# Patient Record
Sex: MALE | Race: WHITE | NOT HISPANIC OR LATINO | Employment: OTHER | URBAN - METROPOLITAN AREA
[De-identification: names, ages, dates, MRNs, and addresses within clinical notes are randomized per-mention and may not be internally consistent; named-entity substitution may affect disease eponyms.]

---

## 2018-05-14 DIAGNOSIS — E78.5 HYPERLIPIDEMIA, UNSPECIFIED HYPERLIPIDEMIA TYPE: Primary | ICD-10-CM

## 2018-05-14 RX ORDER — ATORVASTATIN CALCIUM 40 MG/1
40 TABLET, FILM COATED ORAL DAILY
COMMUNITY
End: 2018-05-14 | Stop reason: SDUPTHER

## 2018-05-15 RX ORDER — ATORVASTATIN CALCIUM 40 MG/1
40 TABLET, FILM COATED ORAL DAILY
Qty: 90 TABLET | Refills: 0 | Status: SHIPPED | OUTPATIENT
Start: 2018-05-15 | End: 2018-05-30 | Stop reason: SDUPTHER

## 2018-05-22 DIAGNOSIS — E11.9 TYPE 2 DIABETES MELLITUS WITHOUT COMPLICATION, WITHOUT LONG-TERM CURRENT USE OF INSULIN (HCC): Primary | ICD-10-CM

## 2018-05-22 RX ORDER — METFORMIN HYDROCHLORIDE 500 MG/1
500 TABLET, EXTENDED RELEASE ORAL 2 TIMES DAILY WITH MEALS
Qty: 180 TABLET | Refills: 0 | Status: SHIPPED | OUTPATIENT
Start: 2018-05-22 | End: 2018-05-30 | Stop reason: SDUPTHER

## 2018-05-30 ENCOUNTER — OFFICE VISIT (OUTPATIENT)
Dept: ENDOCRINOLOGY | Facility: HOSPITAL | Age: 73
End: 2018-05-30
Payer: MEDICARE

## 2018-05-30 VITALS
HEART RATE: 48 BPM | HEIGHT: 70 IN | SYSTOLIC BLOOD PRESSURE: 150 MMHG | WEIGHT: 186.6 LBS | BODY MASS INDEX: 26.71 KG/M2 | DIASTOLIC BLOOD PRESSURE: 82 MMHG

## 2018-05-30 DIAGNOSIS — E11.9 TYPE 2 DIABETES MELLITUS WITHOUT COMPLICATION, WITHOUT LONG-TERM CURRENT USE OF INSULIN (HCC): ICD-10-CM

## 2018-05-30 DIAGNOSIS — E78.5 HYPERLIPIDEMIA, UNSPECIFIED HYPERLIPIDEMIA TYPE: ICD-10-CM

## 2018-05-30 DIAGNOSIS — I10 HYPERTENSION, UNSPECIFIED TYPE: ICD-10-CM

## 2018-05-30 DIAGNOSIS — E11.8 TYPE 2 DIABETES MELLITUS WITH COMPLICATION, WITHOUT LONG-TERM CURRENT USE OF INSULIN (HCC): Primary | ICD-10-CM

## 2018-05-30 PROCEDURE — 99204 OFFICE O/P NEW MOD 45 MIN: CPT | Performed by: INTERNAL MEDICINE

## 2018-05-30 RX ORDER — METOPROLOL SUCCINATE 50 MG/1
50 TABLET, EXTENDED RELEASE ORAL DAILY
Refills: 0 | COMMUNITY
Start: 2018-05-12 | End: 2018-05-30 | Stop reason: SDUPTHER

## 2018-05-30 RX ORDER — IBUPROFEN 800 MG
TABLET ORAL
COMMUNITY

## 2018-05-30 RX ORDER — METOPROLOL SUCCINATE 50 MG/1
50 TABLET, EXTENDED RELEASE ORAL DAILY
Qty: 90 TABLET | Refills: 3 | Status: SHIPPED | OUTPATIENT
Start: 2018-05-30 | End: 2019-05-21 | Stop reason: SDUPTHER

## 2018-05-30 RX ORDER — RAMIPRIL 10 MG/1
10 CAPSULE ORAL DAILY
Refills: 1 | COMMUNITY
Start: 2018-04-05 | End: 2018-10-08 | Stop reason: SDUPTHER

## 2018-05-30 RX ORDER — ATORVASTATIN CALCIUM 40 MG/1
40 TABLET, FILM COATED ORAL DAILY
Qty: 90 TABLET | Refills: 3 | Status: SHIPPED | OUTPATIENT
Start: 2018-05-30 | End: 2019-06-09 | Stop reason: SDUPTHER

## 2018-05-30 RX ORDER — SITAGLIPTIN 100 MG/1
100 TABLET, FILM COATED ORAL DAILY
Refills: 3 | COMMUNITY
Start: 2018-03-03 | End: 2018-05-30 | Stop reason: SDUPTHER

## 2018-05-30 RX ORDER — SITAGLIPTIN 100 MG/1
100 TABLET, FILM COATED ORAL DAILY
Qty: 90 TABLET | Refills: 3 | Status: SHIPPED | OUTPATIENT
Start: 2018-05-30 | End: 2019-07-12 | Stop reason: SDUPTHER

## 2018-05-30 RX ORDER — ASPIRIN 81 MG/1
81 TABLET, CHEWABLE ORAL DAILY
COMMUNITY
End: 2021-12-10

## 2018-05-30 RX ORDER — METFORMIN HYDROCHLORIDE 500 MG/1
TABLET, EXTENDED RELEASE ORAL
Qty: 360 TABLET | Refills: 3 | Status: SHIPPED | OUTPATIENT
Start: 2018-05-30 | End: 2018-12-19 | Stop reason: SDUPTHER

## 2018-05-30 NOTE — PROGRESS NOTES
5/30/2018    Assessment/Plan      Diagnoses and all orders for this visit:    Type 2 diabetes mellitus with complication, without long-term current use of insulin (HCC)  -     JANUVIA 100 MG tablet; Take 1 tablet (100 mg total) by mouth daily  -     HEMOGLOBIN A1C W/ EAG ESTIMATION Lab Collect; Future  -     Comprehensive metabolic panel Lab Collect; Future  -     Microalbumin / creatinine urine ratio- Lab Collect; Future  -     glucose blood (ACCU-CHEK DEBORAH PLUS) test strip; Check once daily  Hyperlipidemia, unspecified hyperlipidemia type  -     atorvastatin (LIPITOR) 40 mg tablet; Take 1 tablet (40 mg total) by mouth daily    Hypertension, unspecified type  -     metoprolol succinate (TOPROL-XL) 50 mg 24 hr tablet; Take 1 tablet (50 mg total) by mouth daily    Type 2 diabetes mellitus without complication, without long-term current use of insulin (HCC)  -     metFORMIN (GLUCOPHAGE-XR) 500 mg 24 hr tablet; 2 tab bid    Other orders  -     Discontinue: JANUVIA 100 MG tablet; Take 100 mg by mouth daily  -     ramipril (ALTACE) 10 MG capsule; Take 10 mg by mouth daily  -     Discontinue: metoprolol succinate (TOPROL-XL) 50 mg 24 hr tablet; Take 50 mg by mouth daily  -     aspirin 81 mg chewable tablet; Chew 81 mg daily  -     Coenzyme Q10 (COQ10 PO); Take by mouth  -     Cholecalciferol (VITAMIN D3) 400 units CAPS; Take by mouth  -     FOLIC ACID PO; Take by mouth  -     OMEGA-3 KRILL OIL PO; Take by mouth  -     Lactobacillus (PROBIOTIC ACIDOPHILUS PO); Take by mouth        Assessment/Plan:  1  Type 2 diabetes:  A1c is above goal   Will increase metformin XR to 500 mg tablets, 2 tablets twice a day  Continue Januvia 100 mg daily  He will continue exercising consistently  He will be more mindful his diet  He will send in blood sugar logs in about 1 month for review  Follow-up in 3 months with an A1c and CMP just prior    Will monitor kidney function closely as his creatinine is slightly elevated in GFR slightly low  He was just in [place] and may not have been as hydrated as he should of  Encouraged hydration  If his A1c blood sugars remain above goal, discussed GLP-1 or SGLT2 inhibitors  He is very hesitant to use any type of injection  Therefore an SGLT2 inhibitor addition may be the next step  2   Hyperlipidemia:  Continue Lipitor  3   Hypertension:  Elevated today  He states that his all controlled in the past   Will monitor this closely on current regimen of ramipril 10 mg daily metoprolol 50 mg XL daily  CC: Diabetes Consult    History of Present Illness     HPI: Levon Rosario is a 68y o  year old male with type 2 diabetes for over 10 years  He is on oral agents at home and takes Metformin 500 bid, Januvia 100 daily  He denies any polyuria, polydipsia, nocturia and blurry vision  He denies neuropathy, nephropathy and retinopathy  Hypoglycemic episodes: No      The patient's last eye exam was in Dec 2017  Blood Sugar/Glucometer/Pump/CGM review:   No logs to review today  Review of Systems   Constitutional: Negative for chills, fatigue and fever  HENT: Negative for trouble swallowing and voice change  Eyes: Negative for visual disturbance  Respiratory: Negative for shortness of breath  Cardiovascular: Negative for chest pain, palpitations and leg swelling  Gastrointestinal: Negative for abdominal pain, nausea and vomiting  Endocrine: Negative for polydipsia and polyuria  Musculoskeletal: Negative for arthralgias and myalgias  Skin: Negative for rash  Neurological: Negative for dizziness, tremors and weakness  Hematological: Negative for adenopathy  Psychiatric/Behavioral: Negative for agitation and confusion  Historical Information   History reviewed  No pertinent past medical history  History reviewed  No pertinent surgical history    Social History   History   Alcohol use Not on file     History   Drug use: Unknown     History   Smoking Status    Former Smoker    Packs/day: 1 00    Types: Cigarettes    Quit date: 1978   Smokeless Tobacco    Never Used     Family History:   Family History   Problem Relation Age of Onset   Oneyda Thornton Breast cancer Mother     Hypertension Mother     Diabetes unspecified Mother     Heart disease Father        Meds/Allergies   Current Outpatient Prescriptions   Medication Sig Dispense Refill    aspirin 81 mg chewable tablet Chew 81 mg daily      atorvastatin (LIPITOR) 40 mg tablet Take 1 tablet (40 mg total) by mouth daily 90 tablet 3    Cholecalciferol (VITAMIN D3) 400 units CAPS Take by mouth      Coenzyme Q10 (COQ10 PO) Take by mouth      FOLIC ACID PO Take by mouth      JANUVIA 100 MG tablet Take 1 tablet (100 mg total) by mouth daily 90 tablet 3    Lactobacillus (PROBIOTIC ACIDOPHILUS PO) Take by mouth      metFORMIN (GLUCOPHAGE-XR) 500 mg 24 hr tablet 2 tab bid 360 tablet 3    metoprolol succinate (TOPROL-XL) 50 mg 24 hr tablet Take 1 tablet (50 mg total) by mouth daily 90 tablet 3    OMEGA-3 KRILL OIL PO Take by mouth      ramipril (ALTACE) 10 MG capsule Take 10 mg by mouth daily  1    glucose blood (ACCU-CHEK DEBORAH PLUS) test strip Check once daily  100 each 6     No current facility-administered medications for this visit  No Known Allergies    Objective   Vitals: Blood pressure 150/82, pulse (!) 48, height 5' 10" (1 778 m), weight 84 6 kg (186 lb 9 6 oz)  Invasive Devices          No matching active lines, drains, or airways          Physical Exam   Constitutional: He is oriented to person, place, and time  He appears well-developed and well-nourished  No distress  HENT:   Head: Normocephalic and atraumatic  Mouth/Throat: No oropharyngeal exudate  Eyes: Conjunctivae and EOM are normal  Pupils are equal, round, and reactive to light  No scleral icterus  Neck: Normal range of motion  Neck supple  No thyromegaly present  Cardiovascular: Normal rate and regular rhythm      No murmur heard   Pulmonary/Chest: Effort normal and breath sounds normal  He has no wheezes  Abdominal: Soft  Bowel sounds are normal  He exhibits no distension  There is no tenderness  Musculoskeletal: Normal range of motion  He exhibits no edema  Neurological: He is alert and oriented to person, place, and time  He exhibits normal muscle tone  Skin: Skin is warm and dry  No rash noted  He is not diaphoretic  Psychiatric: He has a normal mood and affect  His behavior is normal        The history was obtained from the review of the chart and from the patient and family  Lab Results:   Labs from [address] [place] [address] on 05/11/2018:  Urine microalbumin to creatinine ratio 76, total cholesterol 150, HDL 44, triglycerides 152, LDL 81, glucose 165, BUN 22, creatinine 1 25, GFR 57, sodium 139, potassium 5 0, calcium 10 2, albumin 4 7, bilirubin 0 7, alk phos 70, AST 21, ALT 19, A1c 7 6  No future appointments

## 2018-05-30 NOTE — LETTER
May 30, 2018     Ivy Benjamin MD  [address]    Patient: Dimitris Conklin   YOB: 1945   Date of Visit: 5/30/2018       Dear Dr Vega Gains: Thank you for referring Dimitris Conklin to me for evaluation  Below are my notes for this consultation  If you have questions, please do not hesitate to call me  I look forward to following your patient along with you  Sincerely,        Angel Marte DO        CC: No Recipients  Angel Marte DO  5/30/2018  8:51 AM  Sign at close encounter  5/30/2018    Assessment/Plan      Diagnoses and all orders for this visit:    Type 2 diabetes mellitus with complication, without long-term current use of insulin ([address] )  -     JANUVIA 100 MG tablet; Take 1 tablet (100 mg total) by mouth daily  -     HEMOGLOBIN A1C W/ EAG ESTIMATION Lab Collect; Future  -     Comprehensive metabolic panel Lab Collect; Future  -     Microalbumin / creatinine urine ratio- Lab Collect; Future  -     glucose blood (ACCU-CHEK DEBORAH PLUS) test strip; Check once daily  Hyperlipidemia, unspecified hyperlipidemia type  -     atorvastatin (LIPITOR) 40 mg tablet; Take 1 tablet (40 mg total) by mouth daily    Hypertension, unspecified type  -     metoprolol succinate (TOPROL-XL) 50 mg 24 hr tablet; Take 1 tablet (50 mg total) by mouth daily    Type 2 diabetes mellitus without complication, without long-term current use of insulin (HCC)  -     metFORMIN (GLUCOPHAGE-XR) 500 mg 24 hr tablet; 2 tab bid    Other orders  -     Discontinue: JANUVIA 100 MG tablet; Take 100 mg by mouth daily  -     ramipril (ALTACE) 10 MG capsule; Take 10 mg by mouth daily  -     Discontinue: metoprolol succinate (TOPROL-XL) 50 mg 24 hr tablet; Take 50 mg by mouth daily  -     aspirin 81 mg chewable tablet; Chew 81 mg daily  -     Coenzyme Q10 (COQ10 PO); Take by mouth  -     Cholecalciferol (VITAMIN D3) 400 units CAPS;  Take by mouth  -     FOLIC ACID PO; Take by mouth  -     OMEGA-3 KRILL OIL PO; Take by mouth  -     Lactobacillus (PROBIOTIC ACIDOPHILUS PO); Take by mouth        Assessment/Plan:  1  Type 2 diabetes:  A1c is above goal   Will increase metformin XR to 500 mg tablets, 2 tablets twice a day  Continue Januvia 100 mg daily  He will continue exercising consistently  He will be more mindful his diet  He will send in blood sugar logs in about 1 month for review  Follow-up in 3 months with an A1c and CMP just prior  Will monitor kidney function closely as his creatinine is slightly elevated in GFR slightly low  He was just in [place] and may not have been as hydrated as he should of  Encouraged hydration  If his A1c blood sugars remain above goal, discussed GLP-1 or SGLT2 inhibitors  He is very hesitant to use any type of injection  Therefore an SGLT2 inhibitor addition may be the next step  2   Hyperlipidemia:  Continue Lipitor  3   Hypertension:  Elevated today  He states that his all controlled in the past   Will monitor this closely on current regimen of ramipril 10 mg daily metoprolol 50 mg XL daily  CC: Diabetes Consult    History of Present Illness     HPI: Eliza Centeno is a 68y o  year old male with type 2 diabetes for over 10 years  He is on oral agents at home and takes Metformin 500 bid, Januvia 100 daily  He denies any polyuria, polydipsia, nocturia and blurry vision  He denies neuropathy, nephropathy and retinopathy  Hypoglycemic episodes: No      The patient's last eye exam was in Dec 2017  Blood Sugar/Glucometer/Pump/CGM review:   No logs to review today  Review of Systems   Constitutional: Negative for chills, fatigue and fever  HENT: Negative for trouble swallowing and voice change  Eyes: Negative for visual disturbance  Respiratory: Negative for shortness of breath  Cardiovascular: Negative for chest pain, palpitations and leg swelling  Gastrointestinal: Negative for abdominal pain, nausea and vomiting     Endocrine: Negative for polydipsia and polyuria  Musculoskeletal: Negative for arthralgias and myalgias  Skin: Negative for rash  Neurological: Negative for dizziness, tremors and weakness  Hematological: Negative for adenopathy  Psychiatric/Behavioral: Negative for agitation and confusion  Historical Information   History reviewed  No pertinent past medical history  History reviewed  No pertinent surgical history  Social History   History   Alcohol use Not on file     History   Drug use: Unknown     History   Smoking Status    Former Smoker    Packs/day: 1 00    Types: Cigarettes    Quit date: 1978   Smokeless Tobacco    Never Used     Family History:   Family History   Problem Relation Age of Onset   Patricia [place]oharie Breast cancer Mother     Hypertension Mother     Diabetes unspecified Mother     Heart disease Father        Meds/Allergies   Current Outpatient Prescriptions   Medication Sig Dispense Refill    aspirin 81 mg chewable tablet Chew 81 mg daily      atorvastatin (LIPITOR) 40 mg tablet Take 1 tablet (40 mg total) by mouth daily 90 tablet 3    Cholecalciferol (VITAMIN D3) 400 units CAPS Take by mouth      Coenzyme Q10 (COQ10 PO) Take by mouth      FOLIC ACID PO Take by mouth      JANUVIA 100 MG tablet Take 1 tablet (100 mg total) by mouth daily 90 tablet 3    Lactobacillus (PROBIOTIC ACIDOPHILUS PO) Take by mouth      metFORMIN (GLUCOPHAGE-XR) 500 mg 24 hr tablet 2 tab bid 360 tablet 3    metoprolol succinate (TOPROL-XL) 50 mg 24 hr tablet Take 1 tablet (50 mg total) by mouth daily 90 tablet 3    OMEGA-3 KRILL OIL PO Take by mouth      ramipril (ALTACE) 10 MG capsule Take 10 mg by mouth daily  1    glucose blood (ACCU-CHEK DEBORAH PLUS) test strip Check once daily  100 each 6     No current facility-administered medications for this visit  No Known Allergies    Objective   Vitals: Blood pressure 150/82, pulse (!) 48, height 5' 10" (1 778 m), weight 84 6 kg (186 lb 9 6 oz)    Invasive Devices          No matching active lines, drains, or airways          Physical Exam   Constitutional: He is oriented to person, place, and time  He appears well-developed and well-nourished  No distress  HENT:   Head: Normocephalic and atraumatic  Mouth/Throat: No oropharyngeal exudate  Eyes: Conjunctivae and EOM are normal  Pupils are equal, round, and reactive to light  No scleral icterus  Neck: Normal range of motion  Neck supple  No thyromegaly present  Cardiovascular: Normal rate and regular rhythm  No murmur heard  Pulmonary/Chest: Effort normal and breath sounds normal  He has no wheezes  Abdominal: Soft  Bowel sounds are normal  He exhibits no distension  There is no tenderness  Musculoskeletal: Normal range of motion  He exhibits no edema  Neurological: He is alert and oriented to person, place, and time  He exhibits normal muscle tone  Skin: Skin is warm and dry  No rash noted  He is not diaphoretic  Psychiatric: He has a normal mood and affect  His behavior is normal        The history was obtained from the review of the chart and from the patient and family  Lab Results:   Labs from [facility] on 05[facility]11/2018:  Urine microalbumin to creatinine ratio 76, total cholesterol 150, HDL 44, triglycerides 152, LDL 81, glucose 165, BUN 22, creatinine 1 25, GFR 57, sodium 139, potassium 5 0, calcium 10 2, albumin 4 7, bilirubin 0 7, alk phos 70, AST 21, ALT 19, A1c 7 6  No future appointments

## 2018-06-26 ENCOUNTER — TELEPHONE (OUTPATIENT)
Dept: ENDOCRINOLOGY | Facility: HOSPITAL | Age: 73
End: 2018-06-26

## 2018-08-31 LAB
CREAT ?TM UR-SCNC: 82 UMOL/L
HBA1C MFR BLD HPLC: 6.8 %
MICROALBUMIN UR-MCNC: 2.6 MG/L (ref 0–20)
MICROALBUMIN/CREAT UR: 32 MG/G{CREAT}

## 2018-09-27 ENCOUNTER — OFFICE VISIT (OUTPATIENT)
Dept: ENDOCRINOLOGY | Facility: HOSPITAL | Age: 73
End: 2018-09-27
Payer: MEDICARE

## 2018-09-27 VITALS
WEIGHT: 185.8 LBS | HEART RATE: 62 BPM | DIASTOLIC BLOOD PRESSURE: 74 MMHG | BODY MASS INDEX: 26.6 KG/M2 | SYSTOLIC BLOOD PRESSURE: 122 MMHG | HEIGHT: 70 IN

## 2018-09-27 DIAGNOSIS — E55.9 VITAMIN D DEFICIENCY: ICD-10-CM

## 2018-09-27 DIAGNOSIS — E78.5 HYPERLIPIDEMIA, UNSPECIFIED HYPERLIPIDEMIA TYPE: ICD-10-CM

## 2018-09-27 DIAGNOSIS — E11.8 TYPE 2 DIABETES MELLITUS WITH COMPLICATION, WITHOUT LONG-TERM CURRENT USE OF INSULIN (HCC): Primary | ICD-10-CM

## 2018-09-27 DIAGNOSIS — I10 HYPERTENSION, UNSPECIFIED TYPE: ICD-10-CM

## 2018-09-27 PROCEDURE — 99214 OFFICE O/P EST MOD 30 MIN: CPT | Performed by: NURSE PRACTITIONER

## 2018-09-27 NOTE — PROGRESS NOTES
Aleksandra Jaimes 68 y o  male MRN: [id number]    Encounter: [id number]      Assessment/Plan     Assessment: This is a 68y o -year-old male with type 2 diabetes with hypertension and hyperlipidemia  Plan:  1  Type 2 diabetes:  His hemoglobin A1c is now at goal at 6 8  He will continue metformin 1000 mg twice daily and Januvia 100 mg daily  He will continue to check his blood sugars regularly and be mindful of his diet  Check hemoglobin A1c prior to next visit      2  Hyperlipidemia:  Continue Lipitor      3  Hypertension:   He is normotensive in the office today  Continue current regimen with ramipril and metoprolol  Check comprehensive metabolic panel prior to next visit  4   Vitamin-D deficiency:  Continue supplement with vitamin D3 daily        CC:   Type 2 Diabetes follow-up    History of Present Illness     HPI:  68y o  year old male with type 2 diabetes for over 10 years  He is on oral agents at home and takes Metformin 1000 bid, Januvia 100 daily  He denies any polyuria, polydipsia, nocturia and blurry vision  He denies neuropathy, nephropathy and retinopathy  his most recent hemoglobin A1c from August 31, 2018 is 6 8      Hypoglycemic episodes: No       The patient's last eye exam was in Dec 2017  He has no complaints about his feet and does follow Podiatry for regular diabetic foot care with [facility]      Blood Sugar/Glucometer/Pump/CGM review:   Review of his limited blood sugar logs reveals that he is well controlled with most blood sugars under 150  For his hypertension, he is treated with ramipril 10 mg daily and metoprolol 50 mg daily  His hyperlipidemia is treated with atorvastatin 40 mg daily and Omega 3 Krill oil  Daily  For his vitamin-D deficiency, he supplements with vitamin D3 daily  Review of Systems   Constitutional: Negative  Negative for chills, fatigue and fever  HENT: Negative  Negative for trouble swallowing and voice change      Eyes: Negative  Respiratory: Negative for cough and shortness of breath  Cardiovascular: Negative for chest pain and palpitations  Gastrointestinal: Negative for abdominal pain, constipation, diarrhea, nausea and vomiting  Endocrine: Negative  Negative for cold intolerance, heat intolerance, polydipsia, polyphagia and polyuria  Genitourinary: Negative  Musculoskeletal: Negative  Skin: Negative  Allergic/Immunologic: Negative  Neurological: Negative for dizziness, syncope, light-headedness and headaches  Hematological: Negative  Psychiatric/Behavioral: Negative  All other systems reviewed and are negative  Historical Information   No past medical history on file  No past surgical history on file  Social History   History   Alcohol use Not on file     History   Drug use: Unknown     History   Smoking Status    Former Smoker    Packs/day: 1 00    Types: Cigarettes    Quit date: 1978   Smokeless Tobacco    Never Used     Family History:   Family History   Problem Relation Age of Onset   Keke Echavarria Breast cancer Mother     Hypertension Mother     Diabetes unspecified Mother     Heart disease Father        Meds/Allergies   Current Outpatient Prescriptions   Medication Sig Dispense Refill    aspirin 81 mg chewable tablet Chew 81 mg daily      atorvastatin (LIPITOR) 40 mg tablet Take 1 tablet (40 mg total) by mouth daily 90 tablet 3    Cholecalciferol (VITAMIN D3) 400 units CAPS Take by mouth      Coenzyme Q10 (COQ10 PO) Take by mouth      FOLIC ACID PO Take by mouth      glucose blood (ACCU-CHEK DEBORAH PLUS) test strip Check once daily   100 each 6    JANUVIA 100 MG tablet Take 1 tablet (100 mg total) by mouth daily 90 tablet 3    Lactobacillus (PROBIOTIC ACIDOPHILUS PO) Take by mouth      metFORMIN (GLUCOPHAGE-XR) 500 mg 24 hr tablet 2 tab bid 360 tablet 3    metoprolol succinate (TOPROL-XL) 50 mg 24 hr tablet Take 1 tablet (50 mg total) by mouth daily 90 tablet 3    OMEGA-3 KRILL OIL PO Take by mouth      ramipril (ALTACE) 10 MG capsule Take 10 mg by mouth daily  1     No current facility-administered medications for this visit  No Known Allergies    Objective   Vitals: Blood pressure 122/74, pulse 62, height 5' 10" (1 778 m), weight 84 3 kg (185 lb 12 8 oz)  Physical Exam   Constitutional: He is oriented to person, place, and time  He appears well-developed and well-nourished  No distress  HENT:   Head: Normocephalic and atraumatic  Nose: Nose normal    Mouth/Throat: Oropharynx is clear and moist    Eyes: Pupils are equal, round, and reactive to light  Conjunctivae and EOM are normal  Right eye exhibits no discharge  Left eye exhibits no discharge  No scleral icterus  Neck: Normal range of motion  Neck supple  No JVD present  No tracheal deviation present  No thyromegaly present  Cardiovascular: Normal rate, regular rhythm, normal heart sounds and intact distal pulses  Exam reveals no gallop and no friction rub  No murmur heard  Pulmonary/Chest: Effort normal and breath sounds normal  No stridor  No respiratory distress  He has no wheezes  He has no rales  He exhibits no tenderness  Abdominal: Soft  Bowel sounds are normal  He exhibits no distension  There is no tenderness  Musculoskeletal: Normal range of motion  He exhibits no edema, tenderness or deformity  Lymphadenopathy:     He has no cervical adenopathy  Neurological: He is alert and oriented to person, place, and time  He displays normal reflexes  No cranial nerve deficit  Coordination normal    Skin: Skin is warm and dry  No rash noted  No erythema  No pallor  Dry skin   Psychiatric: He has a normal mood and affect  His behavior is normal  Judgment and thought content normal    Vitals reviewed  Portions of the record may have been created with voice recognition software   Occasional wrong word or "sound a like" substitutions may have occurred due to the inherent limitations of voice recognition software  Read the chart carefully and recognize, using context, where substitutions have occurred

## 2018-09-27 NOTE — PATIENT INSTRUCTIONS
Be mindful of diet  Stay active and stay hydrated  Check your blood sugars regularly  Continue your current diabetic medication regimen with metformin and Januvia  Continue metoprolol and ramipril  Continue atorvastatin and Omega 3 Krill oil  Continue supplement with vitamin D3 daily  Obtain a diabetic eye exam in December 2018

## 2018-10-08 DIAGNOSIS — E11.8 TYPE 2 DIABETES MELLITUS WITH COMPLICATION, UNSPECIFIED WHETHER LONG TERM INSULIN USE: Primary | ICD-10-CM

## 2018-10-08 RX ORDER — RAMIPRIL 10 MG/1
10 CAPSULE ORAL DAILY
Qty: 90 CAPSULE | Refills: 0 | Status: SHIPPED | OUTPATIENT
Start: 2018-10-08 | End: 2018-12-19 | Stop reason: SDUPTHER

## 2018-12-17 LAB
ALBUMIN SERPL-MCNC: 4.5 G/DL (ref 3.6–5.1)
ALBUMIN/GLOB SERPL: 2 (CALC) (ref 1–2.5)
ALP SERPL-CCNC: 73 U/L (ref 40–115)
ALT SERPL-CCNC: 16 U/L (ref 9–46)
AST SERPL-CCNC: 16 U/L (ref 10–35)
BILIRUB SERPL-MCNC: 0.7 MG/DL (ref 0.2–1.2)
BUN SERPL-MCNC: 19 MG/DL (ref 7–25)
BUN/CREAT SERPL: ABNORMAL (CALC) (ref 6–22)
CALCIUM SERPL-MCNC: 9.6 MG/DL (ref 8.6–10.3)
CHLORIDE SERPL-SCNC: 105 MMOL/L (ref 98–110)
CO2 SERPL-SCNC: 29 MMOL/L (ref 20–32)
CREAT SERPL-MCNC: 1.12 MG/DL (ref 0.7–1.18)
EST. AVERAGE GLUCOSE BLD GHB EST-MCNC: 154 (CALC)
EST. AVERAGE GLUCOSE BLD GHB EST-SCNC: 8.5 (CALC)
GLOBULIN SER CALC-MCNC: 2.2 G/DL (CALC) (ref 1.9–3.7)
GLUCOSE SERPL-MCNC: 159 MG/DL (ref 65–99)
HBA1C MFR BLD: 7 % OF TOTAL HGB
POTASSIUM SERPL-SCNC: 4.3 MMOL/L (ref 3.5–5.3)
PROT SERPL-MCNC: 6.7 G/DL (ref 6.1–8.1)
SL AMB EGFR AFRICAN AMERICAN: 75 ML/MIN/1.73M2
SL AMB EGFR NON AFRICAN AMERICAN: 65 ML/MIN/1.73M2
SODIUM SERPL-SCNC: 139 MMOL/L (ref 135–146)

## 2018-12-19 ENCOUNTER — OFFICE VISIT (OUTPATIENT)
Dept: ENDOCRINOLOGY | Facility: HOSPITAL | Age: 73
End: 2018-12-19
Payer: MEDICARE

## 2018-12-19 VITALS
WEIGHT: 189 LBS | HEIGHT: 70 IN | SYSTOLIC BLOOD PRESSURE: 142 MMHG | BODY MASS INDEX: 27.06 KG/M2 | HEART RATE: 56 BPM | DIASTOLIC BLOOD PRESSURE: 86 MMHG

## 2018-12-19 DIAGNOSIS — E78.5 HYPERLIPIDEMIA, UNSPECIFIED HYPERLIPIDEMIA TYPE: ICD-10-CM

## 2018-12-19 DIAGNOSIS — E11.8 TYPE 2 DIABETES MELLITUS WITH COMPLICATION, UNSPECIFIED WHETHER LONG TERM INSULIN USE: ICD-10-CM

## 2018-12-19 DIAGNOSIS — E11.9 TYPE 2 DIABETES MELLITUS WITHOUT COMPLICATION, WITHOUT LONG-TERM CURRENT USE OF INSULIN (HCC): Primary | ICD-10-CM

## 2018-12-19 DIAGNOSIS — I10 ESSENTIAL HYPERTENSION: ICD-10-CM

## 2018-12-19 PROCEDURE — 99214 OFFICE O/P EST MOD 30 MIN: CPT | Performed by: INTERNAL MEDICINE

## 2018-12-19 RX ORDER — RAMIPRIL 10 MG/1
10 CAPSULE ORAL DAILY
Qty: 90 CAPSULE | Refills: 3 | Status: SHIPPED | OUTPATIENT
Start: 2018-12-19 | End: 2019-07-12 | Stop reason: SDUPTHER

## 2018-12-19 RX ORDER — METFORMIN HYDROCHLORIDE 500 MG/1
TABLET, EXTENDED RELEASE ORAL
Qty: 360 TABLET | Refills: 3 | Status: SHIPPED | OUTPATIENT
Start: 2018-12-19 | End: 2019-07-12 | Stop reason: SDUPTHER

## 2018-12-19 NOTE — PROGRESS NOTES
12/19/2018    Assessment/Plan      Diagnoses and all orders for this visit:    Type 2 diabetes mellitus without complication, without long-term current use of insulin ([facility] )  -     HEMOGLOBIN A1C W/ EAG ESTIMATION Lab Collect; Future  -     Comprehensive metabolic panel Lab Collect; Future  -     HEMOGLOBIN A1C W/ EAG ESTIMATION Lab Collect; Future  -     Comprehensive metabolic panel Lab Collect; Future  -     Microalbumin / creatinine urine ratio- Lab Collect; Future  -     TSH, 3rd generation Lab Collect; Future  -     CBC and differential; Future  -     metFORMIN (GLUCOPHAGE-XR) 500 mg 24 hr tablet; 2 tab bid    Essential hypertension    Hyperlipidemia, unspecified hyperlipidemia type  -     Lipid Panel with Direct LDL reflex Lab Collect; Future    Type 2 diabetes mellitus with complication, unspecified whether long term insulin use (HCC)  -     ramipril (ALTACE) 10 MG capsule; Take 1 capsule (10 mg total) by mouth daily        Assessment/Plan:  1  Type 2 diabetes:  A1c reasonable at 7 0  He admits to occasional poor dietary choices since last A1c reading  He he will be going to [place] from December 30th through May and will work hard on increasing activity and better diet  I have given him a lab slip to have done down in [place] for an A1c and CMP and we will call with the results in March  Will see him back in June with labs just prior  2   Hypertension:  Continue current regimen  3   Hyperlipidemia:  Check lipids before June 2019 appointment  CC: Diabetes Consult    History of Present Illness     HPI: Bimal Kyle is a 68y o  year old male with type 2 diabetes for 10 years  He is on oral agents and insulin at home and takes metformin 1000 mg twice a day, Januvia 100 mg daily  He denies any polyuria, polydipsia, nocturia and blurry vision  He denies neuropathy, nephropathy and retinopathy  Hypoglycemic episodes: No      The patient's next eye exam is tomorrow      Blood Sugar/Glucometer/Pump/CGM review: No logs to review today  For hypertension he takes ramipril 10 mg daily and metoprolol  For hyperlipidemia he takes atorvastatin 40 mg daily  Review of Systems   Constitutional: Negative for fatigue  HENT: Negative for trouble swallowing and voice change  Eyes: Negative for visual disturbance  Respiratory: Negative for shortness of breath  Cardiovascular: Negative for palpitations and leg swelling  Gastrointestinal: Negative for abdominal pain, nausea and vomiting  Endocrine: Negative for cold intolerance, heat intolerance, polydipsia and polyuria  Musculoskeletal: Negative for arthralgias and myalgias  Skin: Negative for rash  Neurological: Negative for dizziness, tremors and weakness  Hematological: Negative for adenopathy  Psychiatric/Behavioral: Negative for agitation and confusion  Historical Information   No past medical history on file  No past surgical history on file  Social History   History   Alcohol use Not on file     History   Drug use: Unknown     History   Smoking Status    Former Smoker    Packs/day: 1 00    Types: Cigarettes    Quit date: 1978   Smokeless Tobacco    Never Used     Family History:   Family History   Problem Relation Age of Onset   Raymond Pert Breast cancer Mother     Hypertension Mother     Diabetes unspecified Mother     Heart disease Father        Meds/Allergies   Current Outpatient Prescriptions   Medication Sig Dispense Refill    aspirin 81 mg chewable tablet Chew 81 mg daily      atorvastatin (LIPITOR) 40 mg tablet Take 1 tablet (40 mg total) by mouth daily 90 tablet 3    Cholecalciferol (VITAMIN D3) 400 units CAPS Take by mouth      Coenzyme Q10 (COQ10 PO) Take by mouth      FOLIC ACID PO Take by mouth      glucose blood (ACCU-CHEK DEBORAH PLUS) test strip Check once daily   100 each 6    JANUVIA 100 MG tablet Take 1 tablet (100 mg total) by mouth daily 90 tablet 3    Lactobacillus (PROBIOTIC ACIDOPHILUS PO) Take by mouth      metFORMIN (GLUCOPHAGE-XR) 500 mg 24 hr tablet 2 tab bid 360 tablet 3    metoprolol succinate (TOPROL-XL) 50 mg 24 hr tablet Take 1 tablet (50 mg total) by mouth daily 90 tablet 3    OMEGA-3 KRILL OIL PO Take by mouth      ramipril (ALTACE) 10 MG capsule Take 1 capsule (10 mg total) by mouth daily 90 capsule 3     No current facility-administered medications for this visit  No Known Allergies    Objective   Vitals: Blood pressure 142/86, pulse 56, height 5' 10" (1 778 m), weight 85 7 kg (189 lb)  Invasive Devices          No matching active lines, drains, or airways          Physical Exam   Constitutional: He is oriented to person, place, and time  He appears well-developed and well-nourished  No distress  HENT:   Head: Normocephalic and atraumatic  Eyes: Pupils are equal, round, and reactive to light  Conjunctivae are normal    Neck: Normal range of motion  Neck supple  No thyromegaly present  Cardiovascular: Normal rate and regular rhythm  Pulses:       Dorsalis pedis pulses are 2+ on the right side, and 2+ on the left side  Posterior tibial pulses are 2+ on the right side, and 2+ on the left side  Pulmonary/Chest: Effort normal and breath sounds normal  No respiratory distress  Abdominal: Soft  Bowel sounds are normal  He exhibits no distension  Musculoskeletal: Normal range of motion  He exhibits no edema  Feet:   Right Foot:   Skin Integrity: Negative for ulcer, skin breakdown, erythema, warmth, callus or dry skin  Left Foot:   Skin Integrity: Negative for ulcer, skin breakdown, erythema, warmth, callus or dry skin  Neurological: He is alert and oriented to person, place, and time  He exhibits normal muscle tone  Skin: Skin is warm and dry  No rash noted  He is not diaphoretic  Psychiatric: He has a normal mood and affect  His behavior is normal    Vitals reviewed  Patient's shoes and socks removed  Right Foot/Ankle   Right Foot Inspection  Skin Exam: skin normal and skin intact no dry skin, no warmth, no callus, no erythema, no maceration, no abnormal color, no pre-ulcer, no ulcer and no callus                            Sensory   Vibration: intact    Monofilament testing: intact  Vascular    The right DP pulse is 2+  The right PT pulse is 2+  Left Foot/Ankle  Left Foot Inspection  Skin Exam: skin normal and skin intactno dry skin, no warmth, no erythema, no maceration, normal color, no pre-ulcer, no ulcer and no callus                                         Sensory   Vibration: intact    Monofilament: intact  Vascular    The left DP pulse is 2+  The left PT pulse is 2+  Assign Risk Category:  No deformity present; No loss of protective sensation;        Risk: 0        The history was obtained from the review of the chart and from the patient  Lab Results:    Most recent Alc is  Lab Results   Component Value Date    HGBA1C 7 0 (H) 12/15/2018               Lab Results   Component Value Date    BUN 19 12/15/2018    K 4 3 12/15/2018     12/15/2018    CO2 29 12/15/2018       Lab Results   Component Value Date    ALKPHOS 73 12/15/2018       Recent Results (from the past 04677 hour(s))   Microalbumin / creatinine urine ratio    Collection Time: 08/31/18 12:00 AM   Result Value Ref Range    EXT Creatinine Urine 82     Microalbum  ,U,Random 2 6 0 0 - 20 0 mg/L    EXTERNAL Microalb/Creat Ratio 32    Hemoglobin A1C    Collection Time: 08/31/18 12:00 AM   Result Value Ref Range    Hemoglobin A1C 6 8    Comprehensive metabolic panel    Collection Time: 12/15/18  8:21 AM   Result Value Ref Range    Glucose, Random 159 (H) 65 - 99 mg/dL    BUN 19 7 - 25 mg/dL    Creatinine 1 12 0 70 - 1 18 mg/dL    eGFR Non  65 > OR = 60 mL/min/1 73m2    SL AMB EGFR  75 > OR = 60 mL/min/1 73m2    SL AMB BUN/CREATININE RATIO NOT APPLICABLE 6 - 22 (calc)    Sodium 139 135 - 146 mmol/L    Potassium 4 3 3 5 - 5 3 mmol/L Chloride 105 98 - 110 mmol/L    CO2 29 20 - 32 mmol/L    SL AMB CALCIUM 9 6 8 6 - 10 3 mg/dL    SL AMB PROTEIN, TOTAL 6 7 6 1 - 8 1 g/dL    Albumin 4 5 3 6 - 5 1 g/dL    Globulin 2 2 1 9 - 3 7 g/dL (calc)    Albumin/Globulin Ratio 2 0 1 0 - 2 5 (calc)    TOTAL BILIRUBIN 0 7 0 2 - 1 2 mg/dL    Alkaline Phosphatase 73 40 - 115 U/L    SL AMB AST 16 10 - 35 U/L    SL AMB ALT 16 9 - 46 U/L   Hemoglobin A1C With EAG    Collection Time: 12/15/18  8:21 AM   Result Value Ref Range    Hemoglobin A1C 7 0 (H) <5 7 % of total Hgb    Estimated Average Glucose 154 (calc)    Estimated Average Glucose (mmol/L) 8 5 (calc)         No future appointments  Portions of the record may have been created with voice recognition software  Occasional wrong word or "sound a like" substitutions may have occurred due to the inherent limitations of voice recognition software  Read the chart carefully and recognize, using context, where substitutions have occurred

## 2018-12-20 LAB
LEFT EYE DIABETIC RETINOPATHY: NORMAL
RIGHT EYE DIABETIC RETINOPATHY: NORMAL

## 2019-05-21 DIAGNOSIS — I10 HYPERTENSION, UNSPECIFIED TYPE: ICD-10-CM

## 2019-05-22 RX ORDER — METOPROLOL SUCCINATE 50 MG/1
TABLET, EXTENDED RELEASE ORAL
Qty: 90 TABLET | Refills: 3 | Status: SHIPPED | OUTPATIENT
Start: 2019-05-22 | End: 2019-07-12 | Stop reason: SDUPTHER

## 2019-06-09 DIAGNOSIS — E78.5 HYPERLIPIDEMIA, UNSPECIFIED HYPERLIPIDEMIA TYPE: ICD-10-CM

## 2019-06-10 RX ORDER — ATORVASTATIN CALCIUM 40 MG/1
TABLET, FILM COATED ORAL
Qty: 90 TABLET | Refills: 0 | Status: SHIPPED | OUTPATIENT
Start: 2019-06-10 | End: 2019-07-12 | Stop reason: SDUPTHER

## 2019-07-09 LAB
ALBUMIN SERPL-MCNC: 4.3 G/DL (ref 3.6–5.1)
ALBUMIN/CREAT UR: 107 MCG/MG CREAT
ALBUMIN/GLOB SERPL: 2 (CALC) (ref 1–2.5)
ALP SERPL-CCNC: 76 U/L (ref 40–115)
ALT SERPL-CCNC: 20 U/L (ref 9–46)
AST SERPL-CCNC: 20 U/L (ref 10–35)
BASOPHILS # BLD AUTO: 22 CELLS/UL (ref 0–200)
BASOPHILS NFR BLD AUTO: 0.4 %
BILIRUB SERPL-MCNC: 0.4 MG/DL (ref 0.2–1.2)
BUN SERPL-MCNC: 20 MG/DL (ref 7–25)
BUN/CREAT SERPL: ABNORMAL (CALC) (ref 6–22)
CALCIUM SERPL-MCNC: 9.4 MG/DL (ref 8.6–10.3)
CHLORIDE SERPL-SCNC: 102 MMOL/L (ref 98–110)
CHOLEST SERPL-MCNC: 139 MG/DL
CHOLEST/HDLC SERPL: 4 (CALC)
CO2 SERPL-SCNC: 26 MMOL/L (ref 20–32)
CREAT SERPL-MCNC: 1.16 MG/DL (ref 0.7–1.18)
CREAT UR-MCNC: 60 MG/DL (ref 20–320)
EOSINOPHIL # BLD AUTO: 373 CELLS/UL (ref 15–500)
EOSINOPHIL NFR BLD AUTO: 6.9 %
ERYTHROCYTE [DISTWIDTH] IN BLOOD BY AUTOMATED COUNT: 13.1 % (ref 11–15)
EST. AVERAGE GLUCOSE BLD GHB EST-MCNC: 171 (CALC)
EST. AVERAGE GLUCOSE BLD GHB EST-SCNC: 9.5 (CALC)
GLOBULIN SER CALC-MCNC: 2.2 G/DL (CALC) (ref 1.9–3.7)
GLUCOSE SERPL-MCNC: 161 MG/DL (ref 65–99)
HBA1C MFR BLD: 7.6 % OF TOTAL HGB
HCT VFR BLD AUTO: 37.9 % (ref 38.5–50)
HDLC SERPL-MCNC: 35 MG/DL
HGB BLD-MCNC: 12.5 G/DL (ref 13.2–17.1)
LDLC SERPL CALC-MCNC: 76 MG/DL (CALC)
LYMPHOCYTES # BLD AUTO: 1226 CELLS/UL (ref 850–3900)
LYMPHOCYTES NFR BLD AUTO: 22.7 %
MCH RBC QN AUTO: 30.8 PG (ref 27–33)
MCHC RBC AUTO-ENTMCNC: 33 G/DL (ref 32–36)
MCV RBC AUTO: 93.3 FL (ref 80–100)
MICROALBUMIN UR-MCNC: 6.4 MG/DL
MONOCYTES # BLD AUTO: 551 CELLS/UL (ref 200–950)
MONOCYTES NFR BLD AUTO: 10.2 %
NEUTROPHILS # BLD AUTO: 3229 CELLS/UL (ref 1500–7800)
NEUTROPHILS NFR BLD AUTO: 59.8 %
NONHDLC SERPL-MCNC: 104 MG/DL (CALC)
PLATELET # BLD AUTO: 213 THOUSAND/UL (ref 140–400)
PMV BLD REES-ECKER: 10 FL (ref 7.5–12.5)
POTASSIUM SERPL-SCNC: 3.9 MMOL/L (ref 3.5–5.3)
PROT SERPL-MCNC: 6.5 G/DL (ref 6.1–8.1)
RBC # BLD AUTO: 4.06 MILLION/UL (ref 4.2–5.8)
SL AMB EGFR AFRICAN AMERICAN: 71 ML/MIN/1.73M2
SL AMB EGFR NON AFRICAN AMERICAN: 62 ML/MIN/1.73M2
SODIUM SERPL-SCNC: 136 MMOL/L (ref 135–146)
TRIGL SERPL-MCNC: 183 MG/DL
TSH SERPL-ACNC: 4.55 MIU/L (ref 0.4–4.5)
WBC # BLD AUTO: 5.4 THOUSAND/UL (ref 3.8–10.8)

## 2019-07-12 ENCOUNTER — OFFICE VISIT (OUTPATIENT)
Dept: ENDOCRINOLOGY | Facility: HOSPITAL | Age: 74
End: 2019-07-12
Payer: MEDICARE

## 2019-07-12 VITALS
BODY MASS INDEX: 26.48 KG/M2 | SYSTOLIC BLOOD PRESSURE: 156 MMHG | WEIGHT: 185 LBS | HEART RATE: 50 BPM | HEIGHT: 70 IN | DIASTOLIC BLOOD PRESSURE: 90 MMHG

## 2019-07-12 DIAGNOSIS — E11.9 TYPE 2 DIABETES MELLITUS WITHOUT COMPLICATION, WITHOUT LONG-TERM CURRENT USE OF INSULIN (HCC): Primary | ICD-10-CM

## 2019-07-12 DIAGNOSIS — E11.8 TYPE 2 DIABETES MELLITUS WITH COMPLICATION, WITHOUT LONG-TERM CURRENT USE OF INSULIN (HCC): ICD-10-CM

## 2019-07-12 DIAGNOSIS — E78.5 HYPERLIPIDEMIA, UNSPECIFIED HYPERLIPIDEMIA TYPE: ICD-10-CM

## 2019-07-12 DIAGNOSIS — D64.9 ANEMIA, UNSPECIFIED TYPE: ICD-10-CM

## 2019-07-12 DIAGNOSIS — E11.8 TYPE 2 DIABETES MELLITUS WITH COMPLICATION, UNSPECIFIED WHETHER LONG TERM INSULIN USE: ICD-10-CM

## 2019-07-12 DIAGNOSIS — I10 HYPERTENSION, UNSPECIFIED TYPE: ICD-10-CM

## 2019-07-12 DIAGNOSIS — I10 ESSENTIAL HYPERTENSION: ICD-10-CM

## 2019-07-12 PROCEDURE — 99214 OFFICE O/P EST MOD 30 MIN: CPT | Performed by: INTERNAL MEDICINE

## 2019-07-12 RX ORDER — METOPROLOL SUCCINATE 50 MG/1
50 TABLET, EXTENDED RELEASE ORAL DAILY
Qty: 90 TABLET | Refills: 3 | Status: SHIPPED | OUTPATIENT
Start: 2019-07-12 | End: 2020-08-27 | Stop reason: SDUPTHER

## 2019-07-12 RX ORDER — SITAGLIPTIN 100 MG/1
100 TABLET, FILM COATED ORAL DAILY
Qty: 90 TABLET | Refills: 3 | Status: SHIPPED | OUTPATIENT
Start: 2019-07-12 | End: 2019-07-29 | Stop reason: SDUPTHER

## 2019-07-12 RX ORDER — FOLIC ACID 0.8 MG
500 TABLET ORAL DAILY
COMMUNITY

## 2019-07-12 RX ORDER — ATORVASTATIN CALCIUM 40 MG/1
40 TABLET, FILM COATED ORAL DAILY
Qty: 90 TABLET | Refills: 0 | Status: SHIPPED | OUTPATIENT
Start: 2019-07-12 | End: 2020-06-26

## 2019-07-12 RX ORDER — RAMIPRIL 10 MG/1
10 CAPSULE ORAL DAILY
Qty: 90 CAPSULE | Refills: 3 | Status: SHIPPED | OUTPATIENT
Start: 2019-07-12 | End: 2020-07-31 | Stop reason: SDUPTHER

## 2019-07-12 RX ORDER — METFORMIN HYDROCHLORIDE 500 MG/1
TABLET, EXTENDED RELEASE ORAL
Qty: 360 TABLET | Refills: 3 | Status: SHIPPED | OUTPATIENT
Start: 2019-07-12 | End: 2019-07-29 | Stop reason: SDUPTHER

## 2019-07-12 NOTE — PROGRESS NOTES
7/12/2019    Assessment/Plan      Diagnoses and all orders for this visit:    Type 2 diabetes mellitus without complication, without long-term current use of insulin ([facility] )  -     HEMOGLOBIN A1C W/ EAG ESTIMATION Lab Collect; Future  -     Comprehensive metabolic panel Lab Collect; Future  -     TSH, 3rd generation Lab Collect; Future  -     T4, free Lab Collect; Future  -     Microalbumin / creatinine urine ratio Lab Collect; Future  -     metFORMIN (GLUCOPHAGE-XR) 500 mg 24 hr tablet; 2 tab bid    Essential hypertension    Hyperlipidemia, unspecified hyperlipidemia type  -     atorvastatin (LIPITOR) 40 mg tablet; Take 1 tablet (40 mg total) by mouth daily    Type 2 diabetes mellitus with complication, unspecified whether long term insulin use (HCC)  -     ramipril (ALTACE) 10 MG capsule; Take 1 capsule (10 mg total) by mouth daily    Hypertension, unspecified type  -     metoprolol succinate (TOPROL-XL) 50 mg 24 hr tablet; Take 1 tablet (50 mg total) by mouth daily    Type 2 diabetes mellitus with complication, without long-term current use of insulin ([facility])  -     JANUVIA 100 MG tablet; Take 1 tablet (100 mg total) by mouth daily    Anemia, unspecified type  -     Iron Panel; Future  -     Ferritin Lab Collect; Future  -     CBC and differential- Lab Collect; Future    Other orders  -     Magnesium 500 MG CAPS; Take 500 mg by mouth daily        Assessment/Plan:  1  Type 2 diabetes:  A1c is increased to 7 6  Continue current regimen  He will continue to focus on dietary changes now that he is back in [place] for the summer  Follow-up in 3 months  Check urine microalbumin to creatinine ratio before next appointment to monitor and trend this with a trend in A1c     2  Hypertension:  I recheck his blood pressure and was 142/80  If still on the higher side at next appointment we may need to adjust his regimen  3  Hyperlipidemia:  Check lipids before next appointment        4  Mild anemia:  Check iron panel in repeat CBC before next appointment  CC: Diabetes Consult    History of Present Illness     HPI: Sarah Sargent is a 76y o  year old male with type 2 diabetes for 10 years  He is on p o  Agents at home and takes metformin XR 1000 mg twice a day, Januvia 100 mg daily  He denies any polyuria, polydipsia, nocturia and blurry vision  He denies neuropathy, nephropathy and retinopathy  Hypoglycemic episodes: No      The patient's last eye exam was in Dec 2018  The patient's last foot exam was in Dec 2018 in our office  Blood Sugar/Glucometer/Pump/CGM review: No logs to review today  For hyperlipidemia is maintained on atorvastatin 40 mg daily  For hypertension he continues on ramipril 10 mg daily as well as metoprolol  Review of Systems   Constitutional: Negative for fatigue  HENT: Negative for trouble swallowing and voice change  Eyes: Negative for visual disturbance  Respiratory: Negative for shortness of breath  Cardiovascular: Negative for palpitations and leg swelling  Gastrointestinal: Negative for abdominal pain, nausea and vomiting  Endocrine: Negative for polydipsia and polyuria  Musculoskeletal: Negative for arthralgias and myalgias  Skin: Negative for rash  Neurological: Negative for dizziness, tremors and weakness  Hematological: Negative for adenopathy  Psychiatric/Behavioral: Negative for agitation and confusion  Historical Information   History reviewed  No pertinent past medical history  History reviewed  No pertinent surgical history    Social History   Social History     Substance and Sexual Activity   Alcohol Use Not on file     Social History     Substance and Sexual Activity   Drug Use Not on file     Social History     Tobacco Use   Smoking Status Former Smoker    Packs/day: 1 00    Types: Cigarettes    Last attempt to quit: Everton Rodriguez Years since quittin 5   Smokeless Tobacco Never Used     Family History:   Family History   Problem Relation Age of Onset    Breast cancer Mother     Hypertension Mother     Diabetes unspecified Mother     Heart disease Father        Meds/Allergies   Current Outpatient Medications   Medication Sig Dispense Refill    atorvastatin (LIPITOR) 40 mg tablet Take 1 tablet (40 mg total) by mouth daily 90 tablet 0    Cholecalciferol (VITAMIN D3) 400 units CAPS Take by mouth      Coenzyme Q10 (COQ10 PO) Take by mouth      FOLIC ACID PO Take by mouth      glucose blood (ACCU-CHEK DEBORAH PLUS) test strip Check once daily  100 each 6    JANUVIA 100 MG tablet Take 1 tablet (100 mg total) by mouth daily 90 tablet 3    Lactobacillus (PROBIOTIC ACIDOPHILUS PO) Take by mouth      Magnesium 500 MG CAPS Take 500 mg by mouth daily      metFORMIN (GLUCOPHAGE-XR) 500 mg 24 hr tablet 2 tab bid 360 tablet 3    metoprolol succinate (TOPROL-XL) 50 mg 24 hr tablet Take 1 tablet (50 mg total) by mouth daily 90 tablet 3    OMEGA-3 KRILL OIL PO Take by mouth      ramipril (ALTACE) 10 MG capsule Take 1 capsule (10 mg total) by mouth daily 90 capsule 3    aspirin 81 mg chewable tablet Chew 81 mg daily       No current facility-administered medications for this visit  No Known Allergies    Objective   Vitals: Blood pressure 156/90, pulse (!) 50, height 5' 10" (1 778 m), weight 83 9 kg (185 lb)  Invasive Devices     None                 Physical Exam   Constitutional: He is oriented to person, place, and time  He appears well-developed and well-nourished  No distress  HENT:   Head: Normocephalic and atraumatic  Neck: Normal range of motion  Neck supple  No thyromegaly present  Cardiovascular: Normal rate and regular rhythm  Pulmonary/Chest: Effort normal and breath sounds normal  No respiratory distress  Abdominal: Soft  Bowel sounds are normal    Musculoskeletal: Normal range of motion  He exhibits no edema  Neurological: He is alert and oriented to person, place, and time  He exhibits normal muscle tone     Skin: Skin is warm and dry  No rash noted  He is not diaphoretic  Psychiatric: He has a normal mood and affect  His behavior is normal    Vitals reviewed  The history was obtained from the review of the chart and from the patient  Lab Results:    Most recent Alc is  Lab Results   Component Value Date    HGBA1C 7 6 (H) 07/08/2019           No components found for: HA1C  No components found for: GLU    Lab Results   Component Value Date    CREATININE 1 16 07/08/2019    CREATININE 1 12 12/15/2018    BUN 20 07/08/2019    K 3 9 07/08/2019     07/08/2019    CO2 26 07/08/2019     No results found for: EGFR  No components found for: [facility]    Lab Results   Component Value Date    HDL 35 (L) 07/08/2019    TRIG 183 (H) 07/08/2019       Lab Results   Component Value Date    ALT 20 07/08/2019    AST 20 07/08/2019    ALKPHOS 76 07/08/2019       Lab Results   Component Value Date    TSH 4 55 (H) 07/08/2019       Recent Results (from the past 24258 hour(s))   Microalbumin / creatinine urine ratio    Collection Time: 08/31/18 12:00 AM   Result Value Ref Range    EXT Creatinine Urine 82     Microalbum  ,U,Random 2 6 0 0 - 20 0 mg/L    EXTERNAL Microalb/Creat Ratio 32    Hemoglobin A1C    Collection Time: 08/31/18 12:00 AM   Result Value Ref Range    Hemoglobin A1C 6 8    Comprehensive metabolic panel    Collection Time: 12/15/18  8:21 AM   Result Value Ref Range    Glucose, Random 159 (H) 65 - 99 mg/dL    BUN 19 7 - 25 mg/dL    Creatinine 1 12 0 70 - 1 18 mg/dL    eGFR Non  65 > OR = 60 mL/min/1 73m2    eGFR  75 > OR = 60 mL/min/1 73m2    SL AMB BUN/CREATININE RATIO NOT APPLICABLE 6 - 22 (calc)    Sodium 139 135 - 146 mmol/L    Potassium 4 3 3 5 - 5 3 mmol/L    Chloride 105 98 - 110 mmol/L    CO2 29 20 - 32 mmol/L    SL AMB CALCIUM 9 6 8 6 - 10 3 mg/dL    Protein, Total 6 7 6 1 - 8 1 g/dL    Albumin 4 5 3 6 - 5 1 g/dL    Globulin 2 2 1 9 - 3 7 g/dL (calc)    Albumin/Globulin Ratio 2 0 1 0 - 2 5 (calc)    TOTAL BILIRUBIN 0 7 0 2 - 1 2 mg/dL    Alkaline Phosphatase 73 40 - 115 U/L    AST 16 10 - 35 U/L    ALT 16 9 - 46 U/L   Hemoglobin A1C With EAG    Collection Time: 12/15/18  8:21 AM   Result Value Ref Range    Hemoglobin A1C 7 0 (H) <5 7 % of total Hgb    Estimated Average Glucose 154 (calc)    Estimated Average Glucose (mmol/L) 8 5 (calc)   Hm Diabetes Eye Exam    Collection Time: 12/20/18 12:00 AM   Result Value Ref Range    Right Eye Diabetic Retinopathy None     Left Eye Diabetic Retinopathy None    Lipid Panel with Direct LDL reflex    Collection Time: 07/08/19  7:10 AM   Result Value Ref Range    Total Cholesterol 139 <200 mg/dL    HDL 35 (L) >40 mg/dL    Triglycerides 183 (H) <150 mg/dL    LDL Direct 76 mg/dL (calc)    Chol HDLC Ratio 4 0 <5 0 (calc)    Non-HDL Cholesterol 104 <130 mg/dL (calc)   Microalbumin, Random Urine (W/Creatinine)    Collection Time: 07/08/19  7:10 AM   Result Value Ref Range    Creatinine, Urine 60 20 - 320 mg/dL    Microalbum  ,U,Random 6 4 See Note: mg/dL    Microalb/Creat Ratio 107 (H) <30 mcg/mg creat   Comprehensive metabolic panel    Collection Time: 07/08/19  7:10 AM   Result Value Ref Range    Glucose, Random 161 (H) 65 - 99 mg/dL    BUN 20 7 - 25 mg/dL    Creatinine 1 16 0 70 - 1 18 mg/dL    eGFR Non  62 > OR = 60 mL/min/1 73m2    eGFR  71 > OR = 60 mL/min/1 73m2    SL AMB BUN/CREATININE RATIO NOT APPLICABLE 6 - 22 (calc)    Sodium 136 135 - 146 mmol/L    Potassium 3 9 3 5 - 5 3 mmol/L    Chloride 102 98 - 110 mmol/L    CO2 26 20 - 32 mmol/L    SL AMB CALCIUM 9 4 8 6 - 10 3 mg/dL    Protein, Total 6 5 6 1 - 8 1 g/dL    Albumin 4 3 3 6 - 5 1 g/dL    Globulin 2 2 1 9 - 3 7 g/dL (calc)    Albumin/Globulin Ratio 2 0 1 0 - 2 5 (calc)    TOTAL BILIRUBIN 0 4 0 2 - 1 2 mg/dL    Alkaline Phosphatase 76 40 - 115 U/L    AST 20 10 - 35 U/L    ALT 20 9 - 46 U/L   CBC and differential    Collection Time: 07/08/19  7:10 AM   Result Value Ref Range    White Blood Cell Count 5 4 3 8 - 10 8 Thousand/uL    Red Blood Cell Count 4 06 (L) 4 20 - 5 80 Million/uL    Hemoglobin 12 5 (L) 13 2 - 17 1 g/dL    HCT 37 9 (L) 38 5 - 50 0 %    MCV 93 3 80 0 - 100 0 fL    MCH 30 8 27 0 - 33 0 pg    MCHC 33 0 32 0 - 36 0 g/dL    RDW 13 1 11 0 - 15 0 %    Platelet Count 129 007 - 400 Thousand/uL    SL AMB MPV 10 0 7 5 - 12 5 fL    Neutrophils (Absolute) 3,229 1,500 - 7,800 cells/uL    Lymphocytes (Absolute) 1,226 850 - 3,900 cells/uL    Monocytes (Absolute) 551 200 - 950 cells/uL    Eosinophils (Absolute) 373 15 - 500 cells/uL    Basophils ABS 22 0 - 200 cells/uL    Neutrophils 59 8 %    Lymphocytes 22 7 %    Monocytes 10 2 %    Eosinophils 6 9 %    Basophils PCT 0 4 %   TSH, 3rd generation    Collection Time: 07/08/19  7:10 AM   Result Value Ref Range    TSH 4 55 (H) 0 40 - 4 50 mIU/L   Hemoglobin A1C With EAG    Collection Time: 07/08/19  7:10 AM   Result Value Ref Range    Hemoglobin A1C 7 6 (H) <5 7 % of total Hgb    Estimated Average Glucose 171 (calc)    Estimated Average Glucose (mmol/L) 9 5 (calc)         Future Appointments   Date Time Provider Daniele Alaniz   10/18/2019  7:50 AM DO MARVEL Bowling [facility]       Portions of the record may have been created with voice recognition software  Occasional wrong word or "sound a like" substitutions may have occurred due to the inherent limitations of voice recognition software  Read the chart carefully and recognize, using context, where substitutions have occurred

## 2019-07-29 DIAGNOSIS — E11.9 TYPE 2 DIABETES MELLITUS WITHOUT COMPLICATION, WITHOUT LONG-TERM CURRENT USE OF INSULIN (HCC): ICD-10-CM

## 2019-07-29 DIAGNOSIS — E11.8 TYPE 2 DIABETES MELLITUS WITH COMPLICATION, WITHOUT LONG-TERM CURRENT USE OF INSULIN (HCC): ICD-10-CM

## 2019-07-29 RX ORDER — METFORMIN HYDROCHLORIDE 500 MG/1
TABLET, EXTENDED RELEASE ORAL
Qty: 360 TABLET | Refills: 0 | Status: SHIPPED | OUTPATIENT
Start: 2019-07-29 | End: 2020-10-05 | Stop reason: SDUPTHER

## 2019-07-29 RX ORDER — SITAGLIPTIN 100 MG/1
TABLET, FILM COATED ORAL
Qty: 90 TABLET | Refills: 0 | Status: SHIPPED | OUTPATIENT
Start: 2019-07-29 | End: 2020-10-05 | Stop reason: SDUPTHER

## 2019-07-31 DIAGNOSIS — E11.8 TYPE 2 DIABETES MELLITUS WITH COMPLICATION, WITHOUT LONG-TERM CURRENT USE OF INSULIN (HCC): ICD-10-CM

## 2019-08-01 ENCOUNTER — TELEPHONE (OUTPATIENT)
Dept: ENDOCRINOLOGY | Facility: HOSPITAL | Age: 74
End: 2019-08-01

## 2019-08-01 NOTE — TELEPHONE ENCOUNTER
Patient's wife Marleni Calderon ([phone]) dropped off a [employer] Patient Assistance Form to be filled out for [employer]  It's in you in-box  Let Marleni Calderon know when ready to

## 2019-08-02 NOTE — TELEPHONE ENCOUNTER
Form completed  Left message for patient's wife to find out if she will  or if we need to mail the form

## 2019-10-15 LAB
ALBUMIN SERPL-MCNC: 4.2 G/DL (ref 3.6–5.1)
ALBUMIN/CREAT UR: 80 MCG/MG CREAT
ALBUMIN/GLOB SERPL: 1.8 (CALC) (ref 1–2.5)
ALP SERPL-CCNC: 67 U/L (ref 40–115)
ALT SERPL-CCNC: 18 U/L (ref 9–46)
AST SERPL-CCNC: 18 U/L (ref 10–35)
BASOPHILS # BLD AUTO: 41 CELLS/UL (ref 0–200)
BASOPHILS NFR BLD AUTO: 0.7 %
BILIRUB SERPL-MCNC: 0.6 MG/DL (ref 0.2–1.2)
BUN SERPL-MCNC: 20 MG/DL (ref 7–25)
BUN/CREAT SERPL: ABNORMAL (CALC) (ref 6–22)
CALCIUM SERPL-MCNC: 9.4 MG/DL (ref 8.6–10.3)
CHLORIDE SERPL-SCNC: 105 MMOL/L (ref 98–110)
CO2 SERPL-SCNC: 27 MMOL/L (ref 20–32)
CREAT SERPL-MCNC: 1.16 MG/DL (ref 0.7–1.18)
CREAT UR-MCNC: 66 MG/DL (ref 20–320)
EOSINOPHIL # BLD AUTO: 452 CELLS/UL (ref 15–500)
EOSINOPHIL NFR BLD AUTO: 7.8 %
ERYTHROCYTE [DISTWIDTH] IN BLOOD BY AUTOMATED COUNT: 13.1 % (ref 11–15)
EST. AVERAGE GLUCOSE BLD GHB EST-MCNC: 154 (CALC)
EST. AVERAGE GLUCOSE BLD GHB EST-SCNC: 8.5 (CALC)
FERRITIN SERPL-MCNC: 94 NG/ML (ref 24–380)
GLOBULIN SER CALC-MCNC: 2.4 G/DL (CALC) (ref 1.9–3.7)
GLUCOSE SERPL-MCNC: 130 MG/DL (ref 65–99)
HBA1C MFR BLD: 7 % OF TOTAL HGB
HCT VFR BLD AUTO: 36.4 % (ref 38.5–50)
HGB BLD-MCNC: 11.9 G/DL (ref 13.2–17.1)
IRON SATN MFR SERPL: 18 % (CALC) (ref 20–48)
IRON SERPL-MCNC: 59 MCG/DL (ref 50–180)
LYMPHOCYTES # BLD AUTO: 1508 CELLS/UL (ref 850–3900)
LYMPHOCYTES NFR BLD AUTO: 26 %
MCH RBC QN AUTO: 30.4 PG (ref 27–33)
MCHC RBC AUTO-ENTMCNC: 32.7 G/DL (ref 32–36)
MCV RBC AUTO: 93.1 FL (ref 80–100)
MICROALBUMIN UR-MCNC: 5.3 MG/DL
MONOCYTES # BLD AUTO: 592 CELLS/UL (ref 200–950)
MONOCYTES NFR BLD AUTO: 10.2 %
NEUTROPHILS # BLD AUTO: 3207 CELLS/UL (ref 1500–7800)
NEUTROPHILS NFR BLD AUTO: 55.3 %
PLATELET # BLD AUTO: 213 THOUSAND/UL (ref 140–400)
PMV BLD REES-ECKER: 10.4 FL (ref 7.5–12.5)
POTASSIUM SERPL-SCNC: 4.6 MMOL/L (ref 3.5–5.3)
PROT SERPL-MCNC: 6.6 G/DL (ref 6.1–8.1)
PSA SERPL-MCNC: 1 NG/ML
RBC # BLD AUTO: 3.91 MILLION/UL (ref 4.2–5.8)
SL AMB EGFR AFRICAN AMERICAN: 71 ML/MIN/1.73M2
SL AMB EGFR NON AFRICAN AMERICAN: 62 ML/MIN/1.73M2
SODIUM SERPL-SCNC: 140 MMOL/L (ref 135–146)
T4 FREE SERPL-MCNC: 1 NG/DL (ref 0.8–1.8)
TIBC SERPL-MCNC: 333 MCG/DL (CALC) (ref 250–425)
TSH SERPL-ACNC: 5.15 MIU/L (ref 0.4–4.5)
WBC # BLD AUTO: 5.8 THOUSAND/UL (ref 3.8–10.8)

## 2019-10-18 ENCOUNTER — OFFICE VISIT (OUTPATIENT)
Dept: ENDOCRINOLOGY | Facility: HOSPITAL | Age: 74
End: 2019-10-18
Payer: MEDICARE

## 2019-10-18 VITALS
SYSTOLIC BLOOD PRESSURE: 156 MMHG | HEIGHT: 70 IN | WEIGHT: 183.6 LBS | BODY MASS INDEX: 26.28 KG/M2 | DIASTOLIC BLOOD PRESSURE: 70 MMHG | HEART RATE: 60 BPM

## 2019-10-18 DIAGNOSIS — E11.9 TYPE 2 DIABETES MELLITUS WITHOUT COMPLICATION, WITHOUT LONG-TERM CURRENT USE OF INSULIN (HCC): Primary | ICD-10-CM

## 2019-10-18 DIAGNOSIS — I10 ESSENTIAL HYPERTENSION: ICD-10-CM

## 2019-10-18 DIAGNOSIS — E78.5 HYPERLIPIDEMIA, UNSPECIFIED HYPERLIPIDEMIA TYPE: ICD-10-CM

## 2019-10-18 PROCEDURE — 99214 OFFICE O/P EST MOD 30 MIN: CPT | Performed by: INTERNAL MEDICINE

## 2019-10-18 NOTE — PROGRESS NOTES
10/18/2019    Assessment/Plan      Diagnoses and all orders for this visit:    Type 2 diabetes mellitus without complication, without long-term current use of insulin (HCC)    Essential hypertension    Hyperlipidemia, unspecified hyperlipidemia type        Assessment/Plan:  1  Type 2 diabetes:  A1c is improved to 7 0  Blood sugars for patient had been in the low 100s lately  Continue current regimen  Follow-up in April or May of next year after he is back from [place]  Check labs as above prior to next appointment  2  Anemia:  Asked him discussed with his family doctor regarding these values  3  Hypertension:  I repeated his blood pressure manually in the office and was 134 over 80  Continue current regimen for now  4  Hyperlipidemia:  Check lipid panel before next appointment  CC: Diabetes Consult    History of Present Illness     HPI: John Gonzales is a 76y o  year old male with type 2 diabetes for over 10 years  He is on oral agents at home and takes metformin XR 1000 mg twice a day, Januvia 100 mg daily  He denies any polyuria, polydipsia, nocturia and blurry vision  He denies neuropathy, nephropathy and retinopathy  Hypoglycemic episodes: No      The patient's last eye exam was in December 2018  The patient's last foot exam was in December 2018 in our office  Blood Sugar/Glucometer/Pump/CGM review: No logs to review today  For hyperlipidemia, continues on atorvastatin 40 mg daily  For hypertension he continues on ramipril 10 mg daily as well as metoprolol  Review of Systems   Constitutional: Negative for fatigue  HENT: Negative for trouble swallowing and voice change  Eyes: Negative for visual disturbance  Respiratory: Negative for shortness of breath  Cardiovascular: Negative for palpitations and leg swelling  Gastrointestinal: Negative for abdominal pain, nausea and vomiting  Endocrine: Negative for polydipsia and polyuria     Musculoskeletal: Negative for arthralgias and myalgias  Skin: Negative for rash  Neurological: Negative for dizziness, tremors and weakness  Hematological: Negative for adenopathy  Psychiatric/Behavioral: Negative for agitation and confusion  Historical Information   No past medical history on file  No past surgical history on file  Social History   Social History     Substance and Sexual Activity   Alcohol Use Not on file     Social History     Substance and Sexual Activity   Drug Use Not on file     Social History     Tobacco Use   Smoking Status Former Smoker    Packs/day: 1 00    Types: Cigarettes    Last attempt to quit:     Years since quittin 8   Smokeless Tobacco Never Used     Family History:   Family History   Problem Relation Age of Onset   Ely Singleton Breast cancer Mother     Hypertension Mother     Diabetes unspecified Mother     Heart disease Father        Meds/Allergies   Current Outpatient Medications   Medication Sig Dispense Refill    aspirin 81 mg chewable tablet Chew 81 mg daily      atorvastatin (LIPITOR) 40 mg tablet Take 1 tablet (40 mg total) by mouth daily 90 tablet 0    Cholecalciferol (VITAMIN D3) 400 units CAPS Take by mouth      Coenzyme Q10 (COQ10 PO) Take by mouth      FOLIC ACID PO Take by mouth      glucose blood (ACCU-CHEK DEBORAH PLUS) test strip TEST ONCE A  each 6    JANUVIA 100 MG tablet TAKE 1 TABLET BY MOUTH EVERY DAY 90 tablet 0    Lactobacillus (PROBIOTIC ACIDOPHILUS PO) Take by mouth      Magnesium 500 MG CAPS Take 500 mg by mouth daily      metFORMIN (GLUCOPHAGE-XR) 500 mg 24 hr tablet TAKE 2 TABLETS BY MOUTH TWICE A  tablet 0    metoprolol succinate (TOPROL-XL) 50 mg 24 hr tablet Take 1 tablet (50 mg total) by mouth daily 90 tablet 3    OMEGA-3 KRILL OIL PO Take by mouth      ramipril (ALTACE) 10 MG capsule Take 1 capsule (10 mg total) by mouth daily 90 capsule 3     No current facility-administered medications for this visit        No Known Allergies    Objective   Vitals: There were no vitals taken for this visit  Invasive Devices     None                 Physical Exam   Constitutional: He is oriented to person, place, and time  He appears well-developed and well-nourished  No distress  HENT:   Head: Normocephalic and atraumatic  Neck: Normal range of motion  Neck supple  No thyromegaly present  Cardiovascular: Normal rate and regular rhythm  Pulses:       Dorsalis pedis pulses are 2+ on the right side, and 2+ on the left side  Posterior tibial pulses are 2+ on the right side, and 2+ on the left side  Pulmonary/Chest: Effort normal and breath sounds normal  No respiratory distress  Abdominal: Soft  Bowel sounds are normal    Musculoskeletal: Normal range of motion  He exhibits no edema  Feet:   Right Foot:   Skin Integrity: Negative for ulcer, skin breakdown, erythema, warmth, callus or dry skin  Left Foot:   Skin Integrity: Negative for ulcer, skin breakdown, erythema, warmth, callus or dry skin  Neurological: He is alert and oriented to person, place, and time  He exhibits normal muscle tone  Skin: Skin is warm and dry  No rash noted  He is not diaphoretic  Psychiatric: He has a normal mood and affect  His behavior is normal    Vitals reviewed  Patient's shoes and socks removed  Right Foot/Ankle   Right Foot Inspection  Skin Exam: skin normal and skin intact no dry skin, no warmth, no callus, no erythema, no maceration, no abnormal color, no pre-ulcer, no ulcer and no callus                            Sensory   Vibration: intact    Monofilament testing: intact  Vascular    The right DP pulse is 2+  The right PT pulse is 2+       Left Foot/Ankle  Left Foot Inspection  Skin Exam: skin normal and skin intactno dry skin, no warmth, no erythema, no maceration, normal color, no pre-ulcer, no ulcer and no callus                                         Sensory   Vibration: intact    Monofilament: intact  Vascular    The left DP pulse is 2+  The left PT pulse is 2+  Assign Risk Category:  No deformity present; No loss of protective sensation;        Risk: 0        The history was obtained from the review of the chart and from the patient  Lab Results:    Most recent Alc is  Lab Results   Component Value Date    HGBA1C 7 0 (H) 10/14/2019           No components found for: HA1C  No components found for: GLU    Lab Results   Component Value Date    CREATININE 1 16 10/14/2019    CREATININE 1 16 07/08/2019    CREATININE 1 12 12/15/2018    BUN 20 10/14/2019    K 4 6 10/14/2019     10/14/2019    CO2 27 10/14/2019     No results found for: EGFR  No components found for: [facility]    Lab Results   Component Value Date    HDL 35 (L) 07/08/2019    TRIG 183 (H) 07/08/2019       Lab Results   Component Value Date    ALT 18 10/14/2019    AST 18 10/14/2019    ALKPHOS 67 10/14/2019       Lab Results   Component Value Date    TSH 5 15 (H) 10/14/2019    FREET4 1 0 10/14/2019       Recent Results (from the past 44959 hour(s))   Microalbumin / creatinine urine ratio    Collection Time: 08/31/18 12:00 AM   Result Value Ref Range    EXT Creatinine Urine 82     Microalbum  ,U,Random 2 6 0 0 - 20 0 mg/L    EXTERNAL Microalb/Creat Ratio 32    Hemoglobin A1C    Collection Time: 08/31/18 12:00 AM   Result Value Ref Range    Hemoglobin A1C 6 8    Comprehensive metabolic panel    Collection Time: 12/15/18  8:21 AM   Result Value Ref Range    Glucose, Random 159 (H) 65 - 99 mg/dL    BUN 19 7 - 25 mg/dL    Creatinine 1 12 0 70 - 1 18 mg/dL    eGFR Non  65 > OR = 60 mL/min/1 73m2    eGFR  75 > OR = 60 mL/min/1 73m2    SL AMB BUN/CREATININE RATIO NOT APPLICABLE 6 - 22 (calc)    Sodium 139 135 - 146 mmol/L    Potassium 4 3 3 5 - 5 3 mmol/L    Chloride 105 98 - 110 mmol/L    CO2 29 20 - 32 mmol/L    SL AMB CALCIUM 9 6 8 6 - 10 3 mg/dL    Protein, Total 6 7 6 1 - 8 1 g/dL    Albumin 4 5 3 6 - 5 1 g/dL    Globulin 2 2 1 9 - 3 7 g/dL (calc)    Albumin/Globulin Ratio 2 0 1 0 - 2 5 (calc)    TOTAL BILIRUBIN 0 7 0 2 - 1 2 mg/dL    Alkaline Phosphatase 73 40 - 115 U/L    AST 16 10 - 35 U/L    ALT 16 9 - 46 U/L   Hemoglobin A1C With EAG    Collection Time: 12/15/18  8:21 AM   Result Value Ref Range    Hemoglobin A1C 7 0 (H) <5 7 % of total Hgb    Estimated Average Glucose 154 (calc)    Estimated Average Glucose (mmol/L) 8 5 (calc)   Hm Diabetes Eye Exam    Collection Time: 12/20/18 12:00 AM   Result Value Ref Range    Right Eye Diabetic Retinopathy None     Left Eye Diabetic Retinopathy None    Lipid Panel with Direct LDL reflex    Collection Time: 07/08/19  7:10 AM   Result Value Ref Range    Total Cholesterol 139 <200 mg/dL    HDL 35 (L) >40 mg/dL    Triglycerides 183 (H) <150 mg/dL    LDL Direct 76 mg/dL (calc)    Chol HDLC Ratio 4 0 <5 0 (calc)    Non-HDL Cholesterol 104 <130 mg/dL (calc)   Microalbumin, Random Urine (W/Creatinine)    Collection Time: 07/08/19  7:10 AM   Result Value Ref Range    Creatinine, Urine 60 20 - 320 mg/dL    Microalbum  ,U,Random 6 4 See Note: mg/dL    Microalb/Creat Ratio 107 (H) <30 mcg/mg creat   Comprehensive metabolic panel    Collection Time: 07/08/19  7:10 AM   Result Value Ref Range    Glucose, Random 161 (H) 65 - 99 mg/dL    BUN 20 7 - 25 mg/dL    Creatinine 1 16 0 70 - 1 18 mg/dL    eGFR Non  62 > OR = 60 mL/min/1 73m2    eGFR  71 > OR = 60 mL/min/1 73m2    SL AMB BUN/CREATININE RATIO NOT APPLICABLE 6 - 22 (calc)    Sodium 136 135 - 146 mmol/L    Potassium 3 9 3 5 - 5 3 mmol/L    Chloride 102 98 - 110 mmol/L    CO2 26 20 - 32 mmol/L    SL AMB CALCIUM 9 4 8 6 - 10 3 mg/dL    Protein, Total 6 5 6 1 - 8 1 g/dL    Albumin 4 3 3 6 - 5 1 g/dL    Globulin 2 2 1 9 - 3 7 g/dL (calc)    Albumin/Globulin Ratio 2 0 1 0 - 2 5 (calc)    TOTAL BILIRUBIN 0 4 0 2 - 1 2 mg/dL    Alkaline Phosphatase 76 40 - 115 U/L    AST 20 10 - 35 U/L    ALT 20 9 - 46 U/L   CBC and differential    Collection Time: 07/08/19  7:10 AM   Result Value Ref Range    White Blood Cell Count 5 4 3 8 - 10 8 Thousand/uL    Red Blood Cell Count 4 06 (L) 4 20 - 5 80 Million/uL    Hemoglobin 12 5 (L) 13 2 - 17 1 g/dL    HCT 37 9 (L) 38 5 - 50 0 %    MCV 93 3 80 0 - 100 0 fL    MCH 30 8 27 0 - 33 0 pg    MCHC 33 0 32 0 - 36 0 g/dL    RDW 13 1 11 0 - 15 0 %    Platelet Count 066 039 - 400 Thousand/uL    SL AMB MPV 10 0 7 5 - 12 5 fL    Neutrophils (Absolute) 3,229 1,500 - 7,800 cells/uL    Lymphocytes (Absolute) 1,226 850 - 3,900 cells/uL    Monocytes (Absolute) 551 200 - 950 cells/uL    Eosinophils (Absolute) 373 15 - 500 cells/uL    Basophils ABS 22 0 - 200 cells/uL    Neutrophils 59 8 %    Lymphocytes 22 7 %    Monocytes 10 2 %    Eosinophils 6 9 %    Basophils PCT 0 4 %   TSH, 3rd generation    Collection Time: 07/08/19  7:10 AM   Result Value Ref Range    TSH 4 55 (H) 0 40 - 4 50 mIU/L   Hemoglobin A1C With EAG    Collection Time: 07/08/19  7:10 AM   Result Value Ref Range    Hemoglobin A1C 7 6 (H) <5 7 % of total Hgb    Estimated Average Glucose 171 (calc)    Estimated Average Glucose (mmol/L) 9 5 (calc)   Microalbumin, Random Urine (W/Creatinine)    Collection Time: 10/14/19  8:00 AM   Result Value Ref Range    Creatinine, Urine 66 20 - 320 mg/dL    Microalbum  ,U,Random 5 3 See Note: mg/dL    Microalb/Creat Ratio 80 (H) <30 mcg/mg creat   TIBC    Collection Time: 10/14/19  8:00 AM   Result Value Ref Range    Iron, Serum 59 50 - 180 mcg/dL    Total Iron Binding Capacity (TIBC) 333 250 - 425 mcg/dL (calc)    Iron Saturation 18 (L) 20 - 48 % (calc)   Comprehensive metabolic panel    Collection Time: 10/14/19  8:00 AM   Result Value Ref Range    Glucose, Random 130 (H) 65 - 99 mg/dL    BUN 20 7 - 25 mg/dL    Creatinine 1 16 0 70 - 1 18 mg/dL    eGFR Non  62 > OR = 60 mL/min/1 73m2    eGFR  71 > OR = 60 mL/min/1 73m2    SL AMB BUN/CREATININE RATIO NOT APPLICABLE 6 - 22 (calc)    Sodium 140 135 - 146 mmol/L    Potassium 4 6 3 5 - 5 3 mmol/L    Chloride 105 98 - 110 mmol/L    CO2 27 20 - 32 mmol/L    SL AMB CALCIUM 9 4 8 6 - 10 3 mg/dL    Protein, Total 6 6 6 1 - 8 1 g/dL    Albumin 4 2 3 6 - 5 1 g/dL    Globulin 2 4 1 9 - 3 7 g/dL (calc)    Albumin/Globulin Ratio 1 8 1 0 - 2 5 (calc)    TOTAL BILIRUBIN 0 6 0 2 - 1 2 mg/dL    Alkaline Phosphatase 67 40 - 115 U/L    AST 18 10 - 35 U/L    ALT 18 9 - 46 U/L   CBC and differential    Collection Time: 10/14/19  8:00 AM   Result Value Ref Range    White Blood Cell Count 5 8 3 8 - 10 8 Thousand/uL    Red Blood Cell Count 3 91 (L) 4 20 - 5 80 Million/uL    Hemoglobin 11 9 (L) 13 2 - 17 1 g/dL    HCT 36 4 (L) 38 5 - 50 0 %    MCV 93 1 80 0 - 100 0 fL    MCH 30 4 27 0 - 33 0 pg    MCHC 32 7 32 0 - 36 0 g/dL    RDW 13 1 11 0 - 15 0 %    Platelet Count 885 884 - 400 Thousand/uL    SL AMB MPV 10 4 7 5 - 12 5 fL    Neutrophils (Absolute) 3,207 1,500 - 7,800 cells/uL    Lymphocytes (Absolute) 1,508 850 - 3,900 cells/uL    Monocytes (Absolute) 592 200 - 950 cells/uL    Eosinophils (Absolute) 452 15 - 500 cells/uL    Basophils ABS 41 0 - 200 cells/uL    Neutrophils 55 3 %    Lymphocytes 26 0 %    Monocytes 10 2 %    Eosinophils 7 8 %    Basophils PCT 0 7 %   Ferritin    Collection Time: 10/14/19  8:00 AM   Result Value Ref Range    Ferritin 94 24 - 380 ng/mL   T4, free    Collection Time: 10/14/19  8:00 AM   Result Value Ref Range    Free t4 1 0 0 8 - 1 8 ng/dL   TSH, 3rd generation    Collection Time: 10/14/19  8:00 AM   Result Value Ref Range    TSH 5 15 (H) 0 40 - 4 50 mIU/L   PSA, Total Screen    Collection Time: 10/14/19  8:00 AM   Result Value Ref Range    Prostate Specific Antigen Total 1 0 < OR = 4 0 ng/mL   Hemoglobin A1C With EAG    Collection Time: 10/14/19  8:00 AM   Result Value Ref Range    Hemoglobin A1C 7 0 (H) <5 7 % of total Hgb    Estimated Average Glucose 154 (calc)    Estimated Average Glucose (mmol/L) 8 5 (calc)         Future Appointments   Date Time Provider Daniele Alaniz   10/18/2019  7:50 AM Onur Mota DO [facility]       Portions of the record may have been created with voice recognition software  Occasional wrong word or "sound a like" substitutions may have occurred due to the inherent limitations of voice recognition software  Read the chart carefully and recognize, using context, where substitutions have occurred

## 2019-11-18 ENCOUNTER — TELEPHONE (OUTPATIENT)
Dept: ENDOCRINOLOGY | Facility: HOSPITAL | Age: 74
End: 2019-11-18

## 2019-11-18 NOTE — TELEPHONE ENCOUNTER
I would suggest we stop the metformin and see if the symptoms improve  The should let us know in the next 1-2 weeks  If the symptoms improve, we could consider reintroducing metformin at a lower dose such as just once daily  If diarrhea does not resolve, we can continue metformin but he will need to look into other causes

## 2019-11-29 ENCOUNTER — TELEPHONE (OUTPATIENT)
Dept: ENDOCRINOLOGY | Facility: HOSPITAL | Age: 74
End: 2019-11-29

## 2019-11-29 NOTE — TELEPHONE ENCOUNTER
Spoke to wife and she said the week of 11/18 he did not metformin  This Monday 11/25 started taking 2 pills  They will start recording sugars to bring in to appointment

## 2019-11-29 NOTE — TELEPHONE ENCOUNTER
Wife called bc patient feels that the new dose of metformin isn't working  He believes his sugars are low but she is not sure if he has been recording them  I made a tentative appointment for Thursday but would you like to see patient sooner or would you like to give him any directions before appointment?

## 2019-11-29 NOTE — TELEPHONE ENCOUNTER
I thought we asked him to stop Metformin a few weeks ago per telephone note  I would ask him to check his sugar twice a day for now and bring in those numbers on Thursday

## 2019-12-03 DIAGNOSIS — E11.9 TYPE 2 DIABETES MELLITUS WITHOUT COMPLICATION, WITHOUT LONG-TERM CURRENT USE OF INSULIN (HCC): Primary | ICD-10-CM

## 2019-12-03 DIAGNOSIS — E11.9 TYPE 2 DIABETES MELLITUS WITHOUT COMPLICATION, WITHOUT LONG-TERM CURRENT USE OF INSULIN (HCC): ICD-10-CM

## 2019-12-03 RX ORDER — BLOOD-GLUCOSE METER
EACH MISCELLANEOUS
Qty: 1 KIT | Refills: 0 | Status: SHIPPED | OUTPATIENT
Start: 2019-12-03

## 2019-12-03 RX ORDER — BLOOD-GLUCOSE METER
EACH MISCELLANEOUS 2 TIMES DAILY WITH MEALS
Qty: 1 KIT | Refills: 0 | Status: SHIPPED | OUTPATIENT
Start: 2019-12-03 | End: 2019-12-03 | Stop reason: SDUPTHER

## 2019-12-03 NOTE — TELEPHONE ENCOUNTER
Wife said patient's meter broke  Do we have any to give patient? She is here for her own appointment

## 2019-12-05 ENCOUNTER — TELEPHONE (OUTPATIENT)
Dept: ENDOCRINOLOGY | Facility: HOSPITAL | Age: 74
End: 2019-12-05

## 2019-12-05 ENCOUNTER — OFFICE VISIT (OUTPATIENT)
Dept: ENDOCRINOLOGY | Facility: HOSPITAL | Age: 74
End: 2019-12-05
Payer: MEDICARE

## 2019-12-05 VITALS
BODY MASS INDEX: 26.34 KG/M2 | DIASTOLIC BLOOD PRESSURE: 86 MMHG | HEIGHT: 70 IN | WEIGHT: 184 LBS | HEART RATE: 56 BPM | SYSTOLIC BLOOD PRESSURE: 134 MMHG

## 2019-12-05 DIAGNOSIS — I10 ESSENTIAL HYPERTENSION: ICD-10-CM

## 2019-12-05 DIAGNOSIS — E11.9 TYPE 2 DIABETES MELLITUS WITHOUT COMPLICATION, WITHOUT LONG-TERM CURRENT USE OF INSULIN (HCC): Primary | ICD-10-CM

## 2019-12-05 DIAGNOSIS — E78.5 HYPERLIPIDEMIA, UNSPECIFIED HYPERLIPIDEMIA TYPE: ICD-10-CM

## 2019-12-05 PROCEDURE — 99214 OFFICE O/P EST MOD 30 MIN: CPT | Performed by: INTERNAL MEDICINE

## 2019-12-05 RX ORDER — GLIMEPIRIDE 2 MG/1
TABLET ORAL
Qty: 90 TABLET | Refills: 1 | Status: SHIPPED | OUTPATIENT
Start: 2019-12-05 | End: 2020-04-20 | Stop reason: SDUPTHER

## 2019-12-05 NOTE — PROGRESS NOTES
12/5/2019    Assessment/Plan      Diagnoses and all orders for this visit:    Type 2 diabetes mellitus without complication, without long-term current use of insulin (HCC)    Essential hypertension    Hyperlipidemia, unspecified hyperlipidemia type        Assessment/Plan:  1  Type 2 diabetes:  I asked him to check his prescription bottle as his diarrhea started after refilling his prescription to make sure they gave him the extended release form of metformin  If they did not, the plan would be to make sure he resumes metformin XR 1000 mg twice a day  We may need to send in a new prescription in this regard  However, if he is on the extended release, I think we are maximized on his dose of 500 mg twice a day  In this regard we will continue Januvia and add glimepiride 2 mg daily  Discussed the side effects of glimepiride including hypoglycemia to be cognizant of hypoglycemia in this regard  Discussed that he should avoid long periods of fasting and he will be more active or fasting for a long period of time to not take glimepiride that day  2  Hypertension:  Normotensive the office today on current regimen  3  Hyperlipidemia:  Check lipid panel as planned before next appointment  CC: Diabetes Consult    History of Present Illness     HPI: Bhavik Robison is a 76y o  year old male with type 2 diabetes for about 10 years  He is on oral agents at home and takes Metformin xr 500 mg bid, Januvia 100 mg daily  He denies any polyuria, polydipsia, nocturia and blurry vision  He denies neuropathy, nephropathy and retinopathy  He recently developed loose stools  He stopped the metformin and this improved  He restarted 500 mg twice a day and has had no further gastrointestinal symptoms  Hypoglycemic episodes: No      The patient's last eye exam was in Dec 2018  The patient's last foot exam was in Oct 2019 in our office      Blood Sugar/Glucometer/Pump/CGM review:  Blood sugars lately have been running in the 150-180 range  No hypoglycemia  For hyperlipidemia, he continues on atorvastatin 40 mg daily  For hypertension he continues on ramipril 10 mg daily as well as metoprolol  Review of Systems   Constitutional: Negative for fatigue  HENT: Negative for trouble swallowing and voice change  Eyes: Negative for visual disturbance  Respiratory: Negative for shortness of breath  Cardiovascular: Negative for palpitations and leg swelling  Gastrointestinal: Negative for abdominal pain, nausea and vomiting  Endocrine: Negative for polydipsia and polyuria  Musculoskeletal: Negative for arthralgias and myalgias  Skin: Negative for rash  Neurological: Negative for dizziness, tremors and weakness  Hematological: Negative for adenopathy  Psychiatric/Behavioral: Negative for agitation and confusion  Historical Information   History reviewed  No pertinent past medical history  History reviewed  No pertinent surgical history    Social History   Social History     Substance and Sexual Activity   Alcohol Use Not on file     Social History     Substance and Sexual Activity   Drug Use Not on file     Social History     Tobacco Use   Smoking Status Former Smoker    Packs/day: 1 00    Types: Cigarettes    Last attempt to quit: Toniann Bamberger Years since quittin 9   Smokeless Tobacco Never Used     Family History:   Family History   Problem Relation Age of Onset    Breast cancer Mother     Hypertension Mother     Diabetes unspecified Mother     Heart disease Father        Meds/Allergies   Current Outpatient Medications   Medication Sig Dispense Refill    atorvastatin (LIPITOR) 40 mg tablet Take 1 tablet (40 mg total) by mouth daily 90 tablet 0    Blood Glucose Monitoring Suppl (ACCU-CHEK DEBORAH PLUS) w/Device KIT Check sugars once a day 1 kit 0    Blood Glucose Monitoring Suppl (ACCU-CHEK DEBORAH PLUS) w/Device KIT Check blood sugars once daily 1 kit 0    Cholecalciferol (VITAMIN D3) 400 units CAPS Take by mouth      Coenzyme Q10 (COQ10 PO) Take by mouth      FOLIC ACID PO Take by mouth      glucose blood (ACCU-CHEK DEBORAH PLUS) test strip TEST ONCE A  each 6    JANUVIA 100 MG tablet TAKE 1 TABLET BY MOUTH EVERY DAY 90 tablet 0    Lactobacillus (PROBIOTIC ACIDOPHILUS PO) Take by mouth      Magnesium 500 MG CAPS Take 500 mg by mouth daily      metFORMIN (GLUCOPHAGE-XR) 500 mg 24 hr tablet TAKE 2 TABLETS BY MOUTH TWICE A DAY (Patient taking differently: Take 500 mg by mouth 2 (two) times a day with meals ) 360 tablet 0    metoprolol succinate (TOPROL-XL) 50 mg 24 hr tablet Take 1 tablet (50 mg total) by mouth daily 90 tablet 3    OMEGA-3 KRILL OIL PO Take by mouth      ramipril (ALTACE) 10 MG capsule Take 1 capsule (10 mg total) by mouth daily 90 capsule 3    aspirin 81 mg chewable tablet Chew 81 mg daily       No current facility-administered medications for this visit  No Known Allergies    Objective   Vitals: Blood pressure 134/86, pulse 56, height 5' 10" (1 778 m), weight 83 5 kg (184 lb)  Invasive Devices     None                 Physical Exam   Constitutional: He is oriented to person, place, and time  He appears well-developed and well-nourished  No distress  HENT:   Head: Normocephalic and atraumatic  Neck: Normal range of motion  Neck supple  No thyromegaly present  Cardiovascular: Normal rate and regular rhythm  Pulmonary/Chest: Effort normal and breath sounds normal  No respiratory distress  Abdominal: Soft  Bowel sounds are normal    Musculoskeletal: Normal range of motion  He exhibits no edema  Neurological: He is alert and oriented to person, place, and time  He exhibits normal muscle tone  Skin: Skin is warm and dry  No rash noted  He is not diaphoretic  Psychiatric: He has a normal mood and affect  His behavior is normal    Vitals reviewed  The history was obtained from the review of the chart and from the patient      Lab Results:    Most recent Alc is  Lab Results   Component Value Date    HGBA1C 7 0 (H) 10/14/2019           No components found for: HA1C  No components found for: GLU    Lab Results   Component Value Date    CREATININE 1 16 10/14/2019    CREATININE 1 16 07/08/2019    CREATININE 1 12 12/15/2018    BUN 20 10/14/2019    K 4 6 10/14/2019     10/14/2019    CO2 27 10/14/2019     No results found for: EGFR  No components found for: [facility]    Lab Results   Component Value Date    HDL 35 (L) 07/08/2019    TRIG 183 (H) 07/08/2019       Lab Results   Component Value Date    ALT 18 10/14/2019    AST 18 10/14/2019    ALKPHOS 67 10/14/2019       Lab Results   Component Value Date    TSH 5 15 (H) 10/14/2019    FREET4 1 0 10/14/2019       Recent Results (from the past 87698 hour(s))   Comprehensive metabolic panel    Collection Time: 12/15/18  8:21 AM   Result Value Ref Range    Glucose, Random 159 (H) 65 - 99 mg/dL    BUN 19 7 - 25 mg/dL    Creatinine 1 12 0 70 - 1 18 mg/dL    eGFR Non  65 > OR = 60 mL/min/1 73m2    eGFR  75 > OR = 60 mL/min/1 73m2    SL AMB BUN/CREATININE RATIO NOT APPLICABLE 6 - 22 (calc)    Sodium 139 135 - 146 mmol/L    Potassium 4 3 3 5 - 5 3 mmol/L    Chloride 105 98 - 110 mmol/L    CO2 29 20 - 32 mmol/L    SL AMB CALCIUM 9 6 8 6 - 10 3 mg/dL    Protein, Total 6 7 6 1 - 8 1 g/dL    Albumin 4 5 3 6 - 5 1 g/dL    Globulin 2 2 1 9 - 3 7 g/dL (calc)    Albumin/Globulin Ratio 2 0 1 0 - 2 5 (calc)    TOTAL BILIRUBIN 0 7 0 2 - 1 2 mg/dL    Alkaline Phosphatase 73 40 - 115 U/L    AST 16 10 - 35 U/L    ALT 16 9 - 46 U/L   Hemoglobin A1C With EAG    Collection Time: 12/15/18  8:21 AM   Result Value Ref Range    Hemoglobin A1C 7 0 (H) <5 7 % of total Hgb    Estimated Average Glucose 154 (calc)    Estimated Average Glucose (mmol/L) 8 5 (calc)    Diabetes Eye Exam    Collection Time: 12/20/18 12:00 AM   Result Value Ref Range    Right Eye Diabetic Retinopathy None     Left Eye Diabetic Retinopathy None Lipid Panel with Direct LDL reflex    Collection Time: 07/08/19  7:10 AM   Result Value Ref Range    Total Cholesterol 139 <200 mg/dL    HDL 35 (L) >40 mg/dL    Triglycerides 183 (H) <150 mg/dL    LDL Direct 76 mg/dL (calc)    Chol HDLC Ratio 4 0 <5 0 (calc)    Non-HDL Cholesterol 104 <130 mg/dL (calc)   Microalbumin, Random Urine (W/Creatinine)    Collection Time: 07/08/19  7:10 AM   Result Value Ref Range    Creatinine, Urine 60 20 - 320 mg/dL    Microalbum  ,U,Random 6 4 See Note: mg/dL    Microalb/Creat Ratio 107 (H) <30 mcg/mg creat   Comprehensive metabolic panel    Collection Time: 07/08/19  7:10 AM   Result Value Ref Range    Glucose, Random 161 (H) 65 - 99 mg/dL    BUN 20 7 - 25 mg/dL    Creatinine 1 16 0 70 - 1 18 mg/dL    eGFR Non  62 > OR = 60 mL/min/1 73m2    eGFR  71 > OR = 60 mL/min/1 73m2    SL AMB BUN/CREATININE RATIO NOT APPLICABLE 6 - 22 (calc)    Sodium 136 135 - 146 mmol/L    Potassium 3 9 3 5 - 5 3 mmol/L    Chloride 102 98 - 110 mmol/L    CO2 26 20 - 32 mmol/L    SL AMB CALCIUM 9 4 8 6 - 10 3 mg/dL    Protein, Total 6 5 6 1 - 8 1 g/dL    Albumin 4 3 3 6 - 5 1 g/dL    Globulin 2 2 1 9 - 3 7 g/dL (calc)    Albumin/Globulin Ratio 2 0 1 0 - 2 5 (calc)    TOTAL BILIRUBIN 0 4 0 2 - 1 2 mg/dL    Alkaline Phosphatase 76 40 - 115 U/L    AST 20 10 - 35 U/L    ALT 20 9 - 46 U/L   CBC and differential    Collection Time: 07/08/19  7:10 AM   Result Value Ref Range    White Blood Cell Count 5 4 3 8 - 10 8 Thousand/uL    Red Blood Cell Count 4 06 (L) 4 20 - 5 80 Million/uL    Hemoglobin 12 5 (L) 13 2 - 17 1 g/dL    HCT 37 9 (L) 38 5 - 50 0 %    MCV 93 3 80 0 - 100 0 fL    MCH 30 8 27 0 - 33 0 pg    MCHC 33 0 32 0 - 36 0 g/dL    RDW 13 1 11 0 - 15 0 %    Platelet Count 396 831 - 400 Thousand/uL    SL AMB MPV 10 0 7 5 - 12 5 fL    Neutrophils (Absolute) 3,229 1,500 - 7,800 cells/uL    Lymphocytes (Absolute) 1,226 850 - 3,900 cells/uL    Monocytes (Absolute) 551 200 - 950 cells/uL    Eosinophils (Absolute) 373 15 - 500 cells/uL    Basophils ABS 22 0 - 200 cells/uL    Neutrophils 59 8 %    Lymphocytes 22 7 %    Monocytes 10 2 %    Eosinophils 6 9 %    Basophils PCT 0 4 %   TSH, 3rd generation    Collection Time: 07/08/19  7:10 AM   Result Value Ref Range    TSH 4 55 (H) 0 40 - 4 50 mIU/L   Hemoglobin A1C With EAG    Collection Time: 07/08/19  7:10 AM   Result Value Ref Range    Hemoglobin A1C 7 6 (H) <5 7 % of total Hgb    Estimated Average Glucose 171 (calc)    Estimated Average Glucose (mmol/L) 9 5 (calc)   Microalbumin, Random Urine (W/Creatinine)    Collection Time: 10/14/19  8:00 AM   Result Value Ref Range    Creatinine, Urine 66 20 - 320 mg/dL    Microalbum  ,U,Random 5 3 See Note: mg/dL    Microalb/Creat Ratio 80 (H) <30 mcg/mg creat   TIBC    Collection Time: 10/14/19  8:00 AM   Result Value Ref Range    Iron, Serum 59 50 - 180 mcg/dL    Total Iron Binding Capacity (TIBC) 333 250 - 425 mcg/dL (calc)    Iron Saturation 18 (L) 20 - 48 % (calc)   Comprehensive metabolic panel    Collection Time: 10/14/19  8:00 AM   Result Value Ref Range    Glucose, Random 130 (H) 65 - 99 mg/dL    BUN 20 7 - 25 mg/dL    Creatinine 1 16 0 70 - 1 18 mg/dL    eGFR Non  62 > OR = 60 mL/min/1 73m2    eGFR  71 > OR = 60 mL/min/1 73m2    SL AMB BUN/CREATININE RATIO NOT APPLICABLE 6 - 22 (calc)    Sodium 140 135 - 146 mmol/L    Potassium 4 6 3 5 - 5 3 mmol/L    Chloride 105 98 - 110 mmol/L    CO2 27 20 - 32 mmol/L    SL AMB CALCIUM 9 4 8 6 - 10 3 mg/dL    Protein, Total 6 6 6 1 - 8 1 g/dL    Albumin 4 2 3 6 - 5 1 g/dL    Globulin 2 4 1 9 - 3 7 g/dL (calc)    Albumin/Globulin Ratio 1 8 1 0 - 2 5 (calc)    TOTAL BILIRUBIN 0 6 0 2 - 1 2 mg/dL    Alkaline Phosphatase 67 40 - 115 U/L    AST 18 10 - 35 U/L    ALT 18 9 - 46 U/L   CBC and differential    Collection Time: 10/14/19  8:00 AM   Result Value Ref Range    White Blood Cell Count 5 8 3 8 - 10 8 Thousand/uL    Red Blood Cell Count 3 91 (L) 4 20 - 5 80 Million/uL    Hemoglobin 11 9 (L) 13 2 - 17 1 g/dL    HCT 36 4 (L) 38 5 - 50 0 %    MCV 93 1 80 0 - 100 0 fL    MCH 30 4 27 0 - 33 0 pg    MCHC 32 7 32 0 - 36 0 g/dL    RDW 13 1 11 0 - 15 0 %    Platelet Count 951 703 - 400 Thousand/uL    SL AMB MPV 10 4 7 5 - 12 5 fL    Neutrophils (Absolute) 3,207 1,500 - 7,800 cells/uL    Lymphocytes (Absolute) 1,508 850 - 3,900 cells/uL    Monocytes (Absolute) 592 200 - 950 cells/uL    Eosinophils (Absolute) 452 15 - 500 cells/uL    Basophils ABS 41 0 - 200 cells/uL    Neutrophils 55 3 %    Lymphocytes 26 0 %    Monocytes 10 2 %    Eosinophils 7 8 %    Basophils PCT 0 7 %   Ferritin    Collection Time: 10/14/19  8:00 AM   Result Value Ref Range    Ferritin 94 24 - 380 ng/mL   T4, free    Collection Time: 10/14/19  8:00 AM   Result Value Ref Range    Free t4 1 0 0 8 - 1 8 ng/dL   TSH, 3rd generation    Collection Time: 10/14/19  8:00 AM   Result Value Ref Range    TSH 5 15 (H) 0 40 - 4 50 mIU/L   PSA, Total Screen    Collection Time: 10/14/19  8:00 AM   Result Value Ref Range    Prostate Specific Antigen Total 1 0 < OR = 4 0 ng/mL   Hemoglobin A1C With EAG    Collection Time: 10/14/19  8:00 AM   Result Value Ref Range    Hemoglobin A1C 7 0 (H) <5 7 % of total Hgb    Estimated Average Glucose 154 (calc)    Estimated Average Glucose (mmol/L) 8 5 (calc)         Future Appointments   Date Time Provider Daniele Alaniz   5/18/2020  9:30 AM Lova An, DO [facility]       Portions of the record may have been created with voice recognition software  Occasional wrong word or "sound a like" substitutions may have occurred due to the inherent limitations of voice recognition software  Read the chart carefully and recognize, using context, where substitutions have occurred

## 2019-12-23 LAB
LEFT EYE DIABETIC RETINOPATHY: NORMAL
RIGHT EYE DIABETIC RETINOPATHY: NORMAL

## 2020-04-20 DIAGNOSIS — E11.9 TYPE 2 DIABETES MELLITUS WITHOUT COMPLICATION, WITHOUT LONG-TERM CURRENT USE OF INSULIN (HCC): ICD-10-CM

## 2020-04-20 RX ORDER — GLIMEPIRIDE 2 MG/1
TABLET ORAL
Qty: 90 TABLET | Refills: 1 | Status: SHIPPED | OUTPATIENT
Start: 2020-04-20 | End: 2020-05-29 | Stop reason: SDUPTHER

## 2020-05-26 LAB
ALBUMIN SERPL-MCNC: 4.4 G/DL (ref 3.6–5.1)
ALBUMIN/CREAT UR: 56 MCG/MG CREAT
ALBUMIN/GLOB SERPL: 1.9 (CALC) (ref 1–2.5)
ALP SERPL-CCNC: 65 U/L (ref 35–144)
ALT SERPL-CCNC: 18 U/L (ref 9–46)
AST SERPL-CCNC: 22 U/L (ref 10–35)
BASOPHILS # BLD AUTO: 29 CELLS/UL (ref 0–200)
BASOPHILS NFR BLD AUTO: 0.5 %
BILIRUB SERPL-MCNC: 0.8 MG/DL (ref 0.2–1.2)
BUN SERPL-MCNC: 18 MG/DL (ref 7–25)
BUN/CREAT SERPL: 14 (CALC) (ref 6–22)
CALCIUM SERPL-MCNC: 9.7 MG/DL (ref 8.6–10.3)
CHLORIDE SERPL-SCNC: 103 MMOL/L (ref 98–110)
CHOLEST SERPL-MCNC: 133 MG/DL
CHOLEST/HDLC SERPL: 3.4 (CALC)
CO2 SERPL-SCNC: 28 MMOL/L (ref 20–32)
CREAT SERPL-MCNC: 1.33 MG/DL (ref 0.7–1.18)
CREAT UR-MCNC: 61 MG/DL (ref 20–320)
EOSINOPHIL # BLD AUTO: 447 CELLS/UL (ref 15–500)
EOSINOPHIL NFR BLD AUTO: 7.7 %
ERYTHROCYTE [DISTWIDTH] IN BLOOD BY AUTOMATED COUNT: 13.1 % (ref 11–15)
GLOBULIN SER CALC-MCNC: 2.3 G/DL (CALC) (ref 1.9–3.7)
GLUCOSE SERPL-MCNC: 125 MG/DL (ref 65–99)
HBA1C MFR BLD: 6.5 % OF TOTAL HGB
HCT VFR BLD AUTO: 36.8 % (ref 38.5–50)
HDLC SERPL-MCNC: 39 MG/DL
HGB BLD-MCNC: 12.4 G/DL (ref 13.2–17.1)
LDLC SERPL CALC-MCNC: 71 MG/DL (CALC)
LYMPHOCYTES # BLD AUTO: 1444 CELLS/UL (ref 850–3900)
LYMPHOCYTES NFR BLD AUTO: 24.9 %
MCH RBC QN AUTO: 31.3 PG (ref 27–33)
MCHC RBC AUTO-ENTMCNC: 33.7 G/DL (ref 32–36)
MCV RBC AUTO: 92.9 FL (ref 80–100)
MICROALBUMIN UR-MCNC: 3.4 MG/DL
MONOCYTES # BLD AUTO: 597 CELLS/UL (ref 200–950)
MONOCYTES NFR BLD AUTO: 10.3 %
NEUTROPHILS # BLD AUTO: 3283 CELLS/UL (ref 1500–7800)
NEUTROPHILS NFR BLD AUTO: 56.6 %
NONHDLC SERPL-MCNC: 94 MG/DL (CALC)
PLATELET # BLD AUTO: 196 THOUSAND/UL (ref 140–400)
PMV BLD REES-ECKER: 10.4 FL (ref 7.5–12.5)
POTASSIUM SERPL-SCNC: 4.7 MMOL/L (ref 3.5–5.3)
PROT SERPL-MCNC: 6.7 G/DL (ref 6.1–8.1)
RBC # BLD AUTO: 3.96 MILLION/UL (ref 4.2–5.8)
SL AMB EGFR AFRICAN AMERICAN: 60 ML/MIN/1.73M2
SL AMB EGFR NON AFRICAN AMERICAN: 52 ML/MIN/1.73M2
SODIUM SERPL-SCNC: 138 MMOL/L (ref 135–146)
TRIGL SERPL-MCNC: 146 MG/DL
TSH SERPL-ACNC: 4.35 MIU/L (ref 0.4–4.5)
WBC # BLD AUTO: 5.8 THOUSAND/UL (ref 3.8–10.8)

## 2020-05-29 ENCOUNTER — TELEMEDICINE (OUTPATIENT)
Dept: ENDOCRINOLOGY | Facility: HOSPITAL | Age: 75
End: 2020-05-29
Payer: MEDICARE

## 2020-05-29 DIAGNOSIS — E11.9 TYPE 2 DIABETES MELLITUS WITHOUT COMPLICATION, WITHOUT LONG-TERM CURRENT USE OF INSULIN (HCC): Primary | ICD-10-CM

## 2020-05-29 DIAGNOSIS — E78.5 HYPERLIPIDEMIA, UNSPECIFIED HYPERLIPIDEMIA TYPE: ICD-10-CM

## 2020-05-29 DIAGNOSIS — I10 ESSENTIAL HYPERTENSION: ICD-10-CM

## 2020-05-29 PROCEDURE — 99214 OFFICE O/P EST MOD 30 MIN: CPT | Performed by: INTERNAL MEDICINE

## 2020-05-29 RX ORDER — GLIMEPIRIDE 2 MG/1
TABLET ORAL
Qty: 90 TABLET | Refills: 3 | Status: SHIPPED | OUTPATIENT
Start: 2020-05-29 | End: 2021-06-01

## 2020-05-29 RX ORDER — PROPRANOLOL/HYDROCHLOROTHIAZID 40 MG-25MG
TABLET ORAL
COMMUNITY

## 2020-05-29 RX ORDER — MULTIVIT WITH MINERALS/LUTEIN
1000 TABLET ORAL DAILY
COMMUNITY

## 2020-06-26 DIAGNOSIS — E78.5 HYPERLIPIDEMIA, UNSPECIFIED HYPERLIPIDEMIA TYPE: ICD-10-CM

## 2020-06-26 RX ORDER — ATORVASTATIN CALCIUM 40 MG/1
TABLET, FILM COATED ORAL
Qty: 90 TABLET | Refills: 3 | Status: SHIPPED | OUTPATIENT
Start: 2020-06-26 | End: 2021-06-01

## 2020-07-31 DIAGNOSIS — I10 ESSENTIAL HYPERTENSION: Primary | ICD-10-CM

## 2020-07-31 DIAGNOSIS — E11.8 TYPE 2 DIABETES MELLITUS WITH COMPLICATION (HCC): ICD-10-CM

## 2020-07-31 RX ORDER — RAMIPRIL 10 MG/1
10 CAPSULE ORAL DAILY
Qty: 90 CAPSULE | Refills: 1 | Status: SHIPPED | OUTPATIENT
Start: 2020-07-31 | End: 2020-11-30 | Stop reason: SDUPTHER

## 2020-08-27 DIAGNOSIS — I10 HYPERTENSION, UNSPECIFIED TYPE: Primary | ICD-10-CM

## 2020-08-27 RX ORDER — METOPROLOL SUCCINATE 50 MG/1
50 TABLET, EXTENDED RELEASE ORAL DAILY
Qty: 90 TABLET | Refills: 3 | Status: SHIPPED | OUTPATIENT
Start: 2020-08-27 | End: 2021-06-02 | Stop reason: SDUPTHER

## 2020-08-27 NOTE — TELEPHONE ENCOUNTER
Pt needs a refill of his metoprolol please  Pt saw Ashly Landry in May and has a follow up in November

## 2020-09-07 NOTE — TELEPHONE ENCOUNTER
Patients wife called, she notes that Sarah Barros has had diarrhea for the past few weeks  He is concerned that this may be related to his Metformin   Please advise    Sarah Barros can be reached at [phone] standing

## 2020-10-05 DIAGNOSIS — E11.8 TYPE 2 DIABETES MELLITUS WITH COMPLICATION, WITHOUT LONG-TERM CURRENT USE OF INSULIN (HCC): ICD-10-CM

## 2020-10-05 DIAGNOSIS — E11.9 TYPE 2 DIABETES MELLITUS WITHOUT COMPLICATION, WITHOUT LONG-TERM CURRENT USE OF INSULIN (HCC): Primary | ICD-10-CM

## 2020-10-06 RX ORDER — SITAGLIPTIN 100 MG/1
100 TABLET, FILM COATED ORAL DAILY
Qty: 90 TABLET | Refills: 1 | Status: SHIPPED | OUTPATIENT
Start: 2020-10-06 | End: 2021-04-01

## 2020-10-06 RX ORDER — METFORMIN HYDROCHLORIDE 500 MG/1
TABLET, EXTENDED RELEASE ORAL
Qty: 180 TABLET | Refills: 1 | Status: SHIPPED | OUTPATIENT
Start: 2020-10-06 | End: 2021-04-01

## 2020-11-10 LAB
ALBUMIN SERPL-MCNC: 4.4 G/DL (ref 3.6–5.1)
ALBUMIN/CREAT UR: 75 MCG/MG CREAT
ALBUMIN/GLOB SERPL: 1.9 (CALC) (ref 1–2.5)
ALP SERPL-CCNC: 76 U/L (ref 35–144)
ALT SERPL-CCNC: 20 U/L (ref 9–46)
AST SERPL-CCNC: 21 U/L (ref 10–35)
BASOPHILS # BLD AUTO: 50 CELLS/UL (ref 0–200)
BASOPHILS NFR BLD AUTO: 0.9 %
BILIRUB SERPL-MCNC: 0.5 MG/DL (ref 0.2–1.2)
BUN SERPL-MCNC: 25 MG/DL (ref 7–25)
BUN/CREAT SERPL: 18 (CALC) (ref 6–22)
CALCIUM SERPL-MCNC: 9.9 MG/DL (ref 8.6–10.3)
CHLORIDE SERPL-SCNC: 106 MMOL/L (ref 98–110)
CHOLEST SERPL-MCNC: 139 MG/DL
CHOLEST/HDLC SERPL: 3.5 (CALC)
CO2 SERPL-SCNC: 25 MMOL/L (ref 20–32)
CREAT SERPL-MCNC: 1.37 MG/DL (ref 0.7–1.18)
CREAT UR-MCNC: 69 MG/DL (ref 20–320)
EOSINOPHIL # BLD AUTO: 353 CELLS/UL (ref 15–500)
EOSINOPHIL NFR BLD AUTO: 6.3 %
ERYTHROCYTE [DISTWIDTH] IN BLOOD BY AUTOMATED COUNT: 13.4 % (ref 11–15)
GLOBULIN SER CALC-MCNC: 2.3 G/DL (CALC) (ref 1.9–3.7)
GLUCOSE SERPL-MCNC: 128 MG/DL (ref 65–99)
HBA1C MFR BLD: 6.4 % OF TOTAL HGB
HCT VFR BLD AUTO: 37.4 % (ref 38.5–50)
HDLC SERPL-MCNC: 40 MG/DL
HGB BLD-MCNC: 12.2 G/DL (ref 13.2–17.1)
LDLC SERPL CALC-MCNC: 76 MG/DL (CALC)
LYMPHOCYTES # BLD AUTO: 1316 CELLS/UL (ref 850–3900)
LYMPHOCYTES NFR BLD AUTO: 23.5 %
MCH RBC QN AUTO: 30.7 PG (ref 27–33)
MCHC RBC AUTO-ENTMCNC: 32.6 G/DL (ref 32–36)
MCV RBC AUTO: 94 FL (ref 80–100)
MICROALBUMIN UR-MCNC: 5.2 MG/DL
MONOCYTES # BLD AUTO: 605 CELLS/UL (ref 200–950)
MONOCYTES NFR BLD AUTO: 10.8 %
NEUTROPHILS # BLD AUTO: 3276 CELLS/UL (ref 1500–7800)
NEUTROPHILS NFR BLD AUTO: 58.5 %
NONHDLC SERPL-MCNC: 99 MG/DL (CALC)
PLATELET # BLD AUTO: 187 THOUSAND/UL (ref 140–400)
PMV BLD REES-ECKER: 10.6 FL (ref 7.5–12.5)
POTASSIUM SERPL-SCNC: 5 MMOL/L (ref 3.5–5.3)
PROT SERPL-MCNC: 6.7 G/DL (ref 6.1–8.1)
PSA SERPL-MCNC: 1.2 NG/ML
RBC # BLD AUTO: 3.98 MILLION/UL (ref 4.2–5.8)
SL AMB EGFR AFRICAN AMERICAN: 58 ML/MIN/1.73M2
SL AMB EGFR NON AFRICAN AMERICAN: 50 ML/MIN/1.73M2
SODIUM SERPL-SCNC: 140 MMOL/L (ref 135–146)
TRIGL SERPL-MCNC: 147 MG/DL
WBC # BLD AUTO: 5.6 THOUSAND/UL (ref 3.8–10.8)

## 2020-11-30 ENCOUNTER — TELEPHONE (OUTPATIENT)
Dept: ADMINISTRATIVE | Facility: OTHER | Age: 75
End: 2020-11-30

## 2020-11-30 ENCOUNTER — OFFICE VISIT (OUTPATIENT)
Dept: ENDOCRINOLOGY | Facility: HOSPITAL | Age: 75
End: 2020-11-30
Payer: MEDICARE

## 2020-11-30 VITALS
SYSTOLIC BLOOD PRESSURE: 136 MMHG | WEIGHT: 197.6 LBS | HEIGHT: 70 IN | DIASTOLIC BLOOD PRESSURE: 82 MMHG | HEART RATE: 60 BPM | BODY MASS INDEX: 28.29 KG/M2

## 2020-11-30 DIAGNOSIS — E11.9 TYPE 2 DIABETES MELLITUS WITHOUT COMPLICATION, WITHOUT LONG-TERM CURRENT USE OF INSULIN (HCC): Primary | ICD-10-CM

## 2020-11-30 DIAGNOSIS — I10 ESSENTIAL HYPERTENSION: ICD-10-CM

## 2020-11-30 DIAGNOSIS — E78.5 HYPERLIPIDEMIA, UNSPECIFIED HYPERLIPIDEMIA TYPE: ICD-10-CM

## 2020-11-30 PROCEDURE — 99214 OFFICE O/P EST MOD 30 MIN: CPT | Performed by: INTERNAL MEDICINE

## 2020-11-30 RX ORDER — RAMIPRIL 10 MG/1
10 CAPSULE ORAL DAILY
Qty: 90 CAPSULE | Refills: 1 | Status: SHIPPED | OUTPATIENT
Start: 2020-11-30 | End: 2021-06-02 | Stop reason: SDUPTHER

## 2021-01-05 LAB
LEFT EYE DIABETIC RETINOPATHY: NORMAL
RIGHT EYE DIABETIC RETINOPATHY: NORMAL

## 2021-04-01 DIAGNOSIS — E11.8 TYPE 2 DIABETES MELLITUS WITH COMPLICATION, WITHOUT LONG-TERM CURRENT USE OF INSULIN (HCC): ICD-10-CM

## 2021-04-01 DIAGNOSIS — E11.9 TYPE 2 DIABETES MELLITUS WITHOUT COMPLICATION, WITHOUT LONG-TERM CURRENT USE OF INSULIN (HCC): ICD-10-CM

## 2021-04-01 RX ORDER — METFORMIN HYDROCHLORIDE 500 MG/1
TABLET, EXTENDED RELEASE ORAL
Qty: 180 TABLET | Refills: 1 | Status: SHIPPED | OUTPATIENT
Start: 2021-04-01 | End: 2021-06-02 | Stop reason: SDUPTHER

## 2021-04-01 RX ORDER — SITAGLIPTIN 100 MG/1
TABLET, FILM COATED ORAL
Qty: 90 TABLET | Refills: 1 | Status: SHIPPED | OUTPATIENT
Start: 2021-04-01 | End: 2021-06-02 | Stop reason: SDUPTHER

## 2021-04-27 DIAGNOSIS — E11.8 TYPE 2 DIABETES MELLITUS WITH COMPLICATION, WITHOUT LONG-TERM CURRENT USE OF INSULIN (HCC): ICD-10-CM

## 2021-04-27 RX ORDER — BLOOD SUGAR DIAGNOSTIC
STRIP MISCELLANEOUS
Qty: 100 EACH | Refills: 6 | Status: SHIPPED | OUTPATIENT
Start: 2021-04-27 | End: 2021-06-02 | Stop reason: SDUPTHER

## 2021-05-13 ENCOUNTER — DOCUMENTATION (OUTPATIENT)
Dept: ENDOCRINOLOGY | Facility: HOSPITAL | Age: 76
End: 2021-05-13

## 2021-05-28 LAB
ALBUMIN SERPL-MCNC: 4.5 G/DL (ref 3.6–5.1)
ALBUMIN/CREAT UR: 82 MCG/MG CREAT
ALBUMIN/GLOB SERPL: 2 (CALC) (ref 1–2.5)
ALP SERPL-CCNC: 74 U/L (ref 35–144)
ALT SERPL-CCNC: 17 U/L (ref 9–46)
AST SERPL-CCNC: 18 U/L (ref 10–35)
BASOPHILS # BLD AUTO: 28 CELLS/UL (ref 0–200)
BASOPHILS NFR BLD AUTO: 0.5 %
BILIRUB SERPL-MCNC: 0.6 MG/DL (ref 0.2–1.2)
BUN SERPL-MCNC: 31 MG/DL (ref 7–25)
BUN/CREAT SERPL: 23 (CALC) (ref 6–22)
CALCIUM SERPL-MCNC: 9.8 MG/DL (ref 8.6–10.3)
CHLORIDE SERPL-SCNC: 104 MMOL/L (ref 98–110)
CHOLEST SERPL-MCNC: 161 MG/DL
CHOLEST/HDLC SERPL: 4.1 (CALC)
CO2 SERPL-SCNC: 25 MMOL/L (ref 20–32)
CREAT SERPL-MCNC: 1.32 MG/DL (ref 0.7–1.18)
CREAT UR-MCNC: 56 MG/DL (ref 20–320)
EOSINOPHIL # BLD AUTO: 392 CELLS/UL (ref 15–500)
EOSINOPHIL NFR BLD AUTO: 7 %
ERYTHROCYTE [DISTWIDTH] IN BLOOD BY AUTOMATED COUNT: 13.2 % (ref 11–15)
GLOBULIN SER CALC-MCNC: 2.3 G/DL (CALC) (ref 1.9–3.7)
GLUCOSE SERPL-MCNC: 160 MG/DL (ref 65–99)
HBA1C MFR BLD: 6.5 % OF TOTAL HGB
HCT VFR BLD AUTO: 37.3 % (ref 38.5–50)
HDLC SERPL-MCNC: 39 MG/DL
HGB BLD-MCNC: 12.4 G/DL (ref 13.2–17.1)
LDLC SERPL CALC-MCNC: 97 MG/DL (CALC)
LYMPHOCYTES # BLD AUTO: 980 CELLS/UL (ref 850–3900)
LYMPHOCYTES NFR BLD AUTO: 17.5 %
MCH RBC QN AUTO: 31.1 PG (ref 27–33)
MCHC RBC AUTO-ENTMCNC: 33.2 G/DL (ref 32–36)
MCV RBC AUTO: 93.5 FL (ref 80–100)
MICROALBUMIN UR-MCNC: 4.6 MG/DL
MONOCYTES # BLD AUTO: 554 CELLS/UL (ref 200–950)
MONOCYTES NFR BLD AUTO: 9.9 %
NEUTROPHILS # BLD AUTO: 3646 CELLS/UL (ref 1500–7800)
NEUTROPHILS NFR BLD AUTO: 65.1 %
NONHDLC SERPL-MCNC: 122 MG/DL (CALC)
PLATELET # BLD AUTO: 198 THOUSAND/UL (ref 140–400)
PMV BLD REES-ECKER: 11.7 FL (ref 7.5–12.5)
POTASSIUM SERPL-SCNC: 4.3 MMOL/L (ref 3.5–5.3)
PROT SERPL-MCNC: 6.8 G/DL (ref 6.1–8.1)
RBC # BLD AUTO: 3.99 MILLION/UL (ref 4.2–5.8)
SL AMB EGFR AFRICAN AMERICAN: 60 ML/MIN/1.73M2
SL AMB EGFR NON AFRICAN AMERICAN: 52 ML/MIN/1.73M2
SODIUM SERPL-SCNC: 136 MMOL/L (ref 135–146)
TRIGL SERPL-MCNC: 148 MG/DL
WBC # BLD AUTO: 5.6 THOUSAND/UL (ref 3.8–10.8)

## 2021-05-29 DIAGNOSIS — E78.5 HYPERLIPIDEMIA, UNSPECIFIED HYPERLIPIDEMIA TYPE: ICD-10-CM

## 2021-06-01 DIAGNOSIS — E11.9 TYPE 2 DIABETES MELLITUS WITHOUT COMPLICATION, WITHOUT LONG-TERM CURRENT USE OF INSULIN (HCC): ICD-10-CM

## 2021-06-01 RX ORDER — ATORVASTATIN CALCIUM 40 MG/1
TABLET, FILM COATED ORAL
Qty: 90 TABLET | Refills: 3 | Status: SHIPPED | OUTPATIENT
Start: 2021-06-01 | End: 2021-06-02 | Stop reason: SDUPTHER

## 2021-06-01 RX ORDER — GLIMEPIRIDE 2 MG/1
TABLET ORAL
Qty: 90 TABLET | Refills: 3 | Status: SHIPPED | OUTPATIENT
Start: 2021-06-01 | End: 2021-06-02 | Stop reason: SDUPTHER

## 2021-06-02 ENCOUNTER — OFFICE VISIT (OUTPATIENT)
Dept: ENDOCRINOLOGY | Facility: HOSPITAL | Age: 76
End: 2021-06-02
Payer: MEDICARE

## 2021-06-02 ENCOUNTER — TELEPHONE (OUTPATIENT)
Dept: ENDOCRINOLOGY | Facility: HOSPITAL | Age: 76
End: 2021-06-02

## 2021-06-02 VITALS
SYSTOLIC BLOOD PRESSURE: 150 MMHG | HEIGHT: 70 IN | HEART RATE: 51 BPM | BODY MASS INDEX: 27.66 KG/M2 | DIASTOLIC BLOOD PRESSURE: 92 MMHG | WEIGHT: 193.2 LBS

## 2021-06-02 DIAGNOSIS — E11.8 TYPE 2 DIABETES MELLITUS WITH COMPLICATION, WITHOUT LONG-TERM CURRENT USE OF INSULIN (HCC): ICD-10-CM

## 2021-06-02 DIAGNOSIS — I10 HYPERTENSION, UNSPECIFIED TYPE: ICD-10-CM

## 2021-06-02 DIAGNOSIS — E11.9 TYPE 2 DIABETES MELLITUS WITHOUT COMPLICATION, WITHOUT LONG-TERM CURRENT USE OF INSULIN (HCC): Primary | ICD-10-CM

## 2021-06-02 DIAGNOSIS — E78.5 HYPERLIPIDEMIA, UNSPECIFIED HYPERLIPIDEMIA TYPE: ICD-10-CM

## 2021-06-02 DIAGNOSIS — I10 ESSENTIAL HYPERTENSION: ICD-10-CM

## 2021-06-02 PROCEDURE — 99214 OFFICE O/P EST MOD 30 MIN: CPT | Performed by: INTERNAL MEDICINE

## 2021-06-02 RX ORDER — GLIMEPIRIDE 2 MG/1
TABLET ORAL
Qty: 90 TABLET | Refills: 3 | Status: SHIPPED | OUTPATIENT
Start: 2021-06-02

## 2021-06-02 RX ORDER — ATORVASTATIN CALCIUM 40 MG/1
40 TABLET, FILM COATED ORAL DAILY
Qty: 90 TABLET | Refills: 3 | Status: SHIPPED | OUTPATIENT
Start: 2021-06-02

## 2021-06-02 RX ORDER — SITAGLIPTIN 100 MG/1
100 TABLET, FILM COATED ORAL DAILY
Qty: 90 TABLET | Refills: 1 | Status: SHIPPED | OUTPATIENT
Start: 2021-06-02 | End: 2022-06-06 | Stop reason: SDUPTHER

## 2021-06-02 RX ORDER — METFORMIN HYDROCHLORIDE 500 MG/1
TABLET, EXTENDED RELEASE ORAL
Qty: 180 TABLET | Refills: 1 | Status: SHIPPED | OUTPATIENT
Start: 2021-06-02 | End: 2022-03-02 | Stop reason: SDUPTHER

## 2021-06-02 RX ORDER — METOPROLOL SUCCINATE 50 MG/1
50 TABLET, EXTENDED RELEASE ORAL DAILY
Qty: 90 TABLET | Refills: 3 | Status: SHIPPED | OUTPATIENT
Start: 2021-06-02

## 2021-06-02 RX ORDER — RAMIPRIL 10 MG/1
CAPSULE ORAL
Qty: 180 CAPSULE | Refills: 3 | Status: SHIPPED | OUTPATIENT
Start: 2021-06-02

## 2021-06-02 RX ORDER — BLOOD SUGAR DIAGNOSTIC
STRIP MISCELLANEOUS
Qty: 100 EACH | Refills: 6 | Status: SHIPPED | OUTPATIENT
Start: 2021-06-02

## 2021-06-02 NOTE — TELEPHONE ENCOUNTER
Spoke with patient's wife  The lab orders need to have Copy To: Dr Whitley Miller on them  Is it okay to reorder ?

## 2021-06-02 NOTE — PROGRESS NOTES
6/2/2021    Assessment/Plan      Diagnoses and all orders for this visit:    Type 2 diabetes mellitus without complication, without long-term current use of insulin (HCC)  -     metFORMIN (GLUCOPHAGE-XR) 500 mg 24 hr tablet; TAKE 1 TABLET BY MOUTH TWICE A DAY  -     Januvia 100 MG tablet; Take 1 tablet (100 mg total) by mouth daily 1 tab daily  -     glimepiride (AMARYL) 2 mg tablet; TAKE 1 TABLET BY MOUTH EVERY DAY  -     Hemoglobin A1C; Future  -     Comprehensive metabolic panel; Future  -     CBC and differential; Future  -     Microalbumin / creatinine urine ratio; Future  -     TSH, 3rd generation; Future    Essential hypertension  -     ramipril (ALTACE) 10 MG capsule; 1 tab bid  -     Basic metabolic panel Lab Collect; Future    Hyperlipidemia, unspecified hyperlipidemia type  -     atorvastatin (LIPITOR) 40 mg tablet; Take 1 tablet (40 mg total) by mouth daily 1 tab daily  -     Lipid Panel with Direct LDL reflex; Future    Type 2 diabetes mellitus with complication, without long-term current use of insulin (HCC)  -     Januvia 100 MG tablet; Take 1 tablet (100 mg total) by mouth daily 1 tab daily  -     glucose blood (Accu-Chek Fawn Plus) test strip; TEST ONCE A DAY    Hypertension, unspecified type  -     metoprolol succinate (TOPROL-XL) 50 mg 24 hr tablet; Take 1 tablet (50 mg total) by mouth daily        Assessment/Plan:  1  Type 2 diabetes:  Overall well controlled on current regimen  Continue current regimen  Follow-up in 6 months with labs as ordered above just prior  Call sooner with any questions or concerns especially hypoglycemia  Patient agreeable to this plan  2  Hypertension:  Repeat manual blood pressure was 142/90  Increase ramipril to 10 mg twice a day  Repeat BMP in about 2 weeks  Call with the results  3  Hyperlipidemia:  Doing well on current regimen  Monitor over time        CC: Diabetes follow-up    History of Present Illness     HPI: Ananya Ross is a 68 y o  year old male with type 2 diabetes for 10-15 years  He is on oral agents at home and takes   Metformin  mg twice a day, glimepiride 2 mg daily, Januvia 100 mg daily  He denies any polyuria, polydipsia, nocturia and blurry vision  He denies neuropathy, nephropathy and retinopathy  Hypoglycemic episodes: No      The patient's last eye exam was in 2021  The patient's last foot exam was in  2020 in our office  Blood Sugar/Glucometer/Pump/CGM review: No logs to review  he has a history of hypertension treated with ramipril 10 mg daily as well as metoprolol  For his history of hyperlipidemia he continues on atorvastatin 40 mg daily  Review of Systems   Constitutional: Negative for fatigue  HENT: Negative for trouble swallowing and voice change  Eyes: Negative for visual disturbance  Respiratory: Negative for shortness of breath  Cardiovascular: Negative for palpitations and leg swelling  Gastrointestinal: Negative for abdominal pain, nausea and vomiting  Endocrine: Negative for polydipsia and polyuria  Musculoskeletal: Negative for arthralgias and myalgias  Skin: Negative for rash  Neurological: Negative for dizziness, tremors and weakness  Hematological: Negative for adenopathy  Psychiatric/Behavioral: Negative for agitation and confusion  Historical Information   History reviewed  No pertinent past medical history  History reviewed  No pertinent surgical history    Social History   Social History     Substance and Sexual Activity   Alcohol Use None     Social History     Substance and Sexual Activity   Drug Use Not on file     Social History     Tobacco Use   Smoking Status Former Smoker    Packs/day: 1 00    Types: Cigarettes    Quit date: 1    Years since quittin 4   Smokeless Tobacco Never Used     Family History:   Family History   Problem Relation Age of Onset   [facility] Breast cancer Mother     Hypertension Mother     Diabetes unspecified Mother     Heart disease Father        Meds/Allergies   Current Outpatient Medications   Medication Sig Dispense Refill    Ascorbic Acid (VITAMIN C) 1000 MG tablet Take 1,000 mg by mouth daily      atorvastatin (LIPITOR) 40 mg tablet Take 1 tablet (40 mg total) by mouth daily 1 tab daily 90 tablet 3    Blood Glucose Monitoring Suppl (ACCU-CHEK DEBORAH PLUS) w/Device KIT Check sugars once a day 1 kit 0    Blood Glucose Monitoring Suppl (ACCU-CHEK DEBORAH PLUS) w/Device KIT Check blood sugars once daily 1 kit 0    Cholecalciferol (VITAMIN D3) 400 units CAPS Take by mouth      Coenzyme Q10 (COQ10 PO) Take by mouth      FOLIC ACID PO Take by mouth      glimepiride (AMARYL) 2 mg tablet TAKE 1 TABLET BY MOUTH EVERY DAY 90 tablet 3    glucose blood (Accu-Chek Deborah Plus) test strip TEST ONCE A  each 6    Januvia 100 MG tablet Take 1 tablet (100 mg total) by mouth daily 1 tab daily 90 tablet 1    Lactobacillus (PROBIOTIC ACIDOPHILUS PO) Take by mouth      Magnesium 500 MG CAPS Take 500 mg by mouth daily      metFORMIN (GLUCOPHAGE-XR) 500 mg 24 hr tablet TAKE 1 TABLET BY MOUTH TWICE A  tablet 1    metoprolol succinate (TOPROL-XL) 50 mg 24 hr tablet Take 1 tablet (50 mg total) by mouth daily 90 tablet 3    OMEGA-3 KRILL OIL PO Take by mouth      ramipril (ALTACE) 10 MG capsule 1 tab bid 180 capsule 3    Turmeric 500 MG CAPS Take by mouth      aspirin 81 mg chewable tablet Chew 81 mg daily       No current facility-administered medications for this visit  No Known Allergies    Objective   Vitals: Blood pressure 150/92, pulse (!) 51, height 5' 10" (1 778 m), weight 87 6 kg (193 lb 3 2 oz)  Invasive Devices     None                 Physical Exam  Vitals signs reviewed  Constitutional:       General: He is not in acute distress  Appearance: He is well-developed  He is not diaphoretic  HENT:      Head: Normocephalic and atraumatic     Eyes:      Conjunctiva/sclera: Conjunctivae normal  Pupils: Pupils are equal, round, and reactive to light  Neck:      Musculoskeletal: Normal range of motion and neck supple  Thyroid: No thyromegaly  Cardiovascular:      Rate and Rhythm: Normal rate and regular rhythm  Pulmonary:      Effort: Pulmonary effort is normal  No respiratory distress  Breath sounds: Normal breath sounds  Abdominal:      General: Bowel sounds are normal       Palpations: Abdomen is soft  Musculoskeletal: Normal range of motion  Skin:     General: Skin is warm and dry  Findings: No rash  Neurological:      Mental Status: He is alert and oriented to person, place, and time  Motor: No abnormal muscle tone  Psychiatric:         Behavior: Behavior normal          The history was obtained from the review of the chart and from the patient  Lab Results:    Most recent Alc is  Lab Results   Component Value Date    HGBA1C 6 5 (H) 05/27/2021           No components found for: HA1C  No components found for: GLU    Lab Results   Component Value Date    CREATININE 1 32 (H) 05/27/2021    CREATININE 1 37 (H) 11/09/2020    CREATININE 1 33 (H) 05/22/2020    BUN 31 (H) 05/27/2021    K 4 3 05/27/2021     05/27/2021    CO2 25 05/27/2021     No results found for: EGFR  No components found for: [facility]    Lab Results   Component Value Date    HDL 39 (L) 05/27/2021    TRIG 148 05/27/2021       Lab Results   Component Value Date    ALT 17 05/27/2021    AST 18 05/27/2021    ALKPHOS 74 05/27/2021       Lab Results   Component Value Date    TSH 4 35 05/22/2020    FREET4 1 0 10/14/2019             No future appointments  Portions of the record may have been created with voice recognition software  Occasional wrong word or "sound a like" substitutions may have occurred due to the inherent limitations of voice recognition software  Read the chart carefully and recognize, using context, where substitutions have occurred

## 2021-06-03 DIAGNOSIS — E11.9 TYPE 2 DIABETES MELLITUS WITHOUT COMPLICATION, WITHOUT LONG-TERM CURRENT USE OF INSULIN (HCC): Primary | ICD-10-CM

## 2021-06-03 DIAGNOSIS — E78.5 HYPERLIPIDEMIA, UNSPECIFIED HYPERLIPIDEMIA TYPE: ICD-10-CM

## 2021-06-14 LAB
LEFT EYE DIABETIC RETINOPATHY: NORMAL
RIGHT EYE DIABETIC RETINOPATHY: NORMAL

## 2021-06-17 ENCOUNTER — TELEPHONE (OUTPATIENT)
Dept: ENDOCRINOLOGY | Facility: HOSPITAL | Age: 76
End: 2021-06-17

## 2021-06-17 LAB
BUN SERPL-MCNC: 24 MG/DL (ref 7–25)
BUN/CREAT SERPL: 19 (CALC) (ref 6–22)
CALCIUM SERPL-MCNC: 9.8 MG/DL (ref 8.6–10.3)
CHLORIDE SERPL-SCNC: 105 MMOL/L (ref 98–110)
CO2 SERPL-SCNC: 26 MMOL/L (ref 20–32)
CREAT SERPL-MCNC: 1.24 MG/DL (ref 0.7–1.18)
GLUCOSE SERPL-MCNC: 134 MG/DL (ref 65–99)
POTASSIUM SERPL-SCNC: 4.3 MMOL/L (ref 3.5–5.3)
SL AMB EGFR AFRICAN AMERICAN: 65 ML/MIN/1.73M2
SL AMB EGFR NON AFRICAN AMERICAN: 56 ML/MIN/1.73M2
SODIUM SERPL-SCNC: 139 MMOL/L (ref 135–146)

## 2021-06-17 NOTE — TELEPHONE ENCOUNTER
Patient called back  He has several concerns about his kidney function and would like to speak to you directly   He is aware that you are out of the office today

## 2021-12-06 LAB
ALBUMIN SERPL-MCNC: 4.5 G/DL (ref 3.6–5.1)
ALBUMIN/CREAT UR: 170 MCG/MG CREAT
ALBUMIN/GLOB SERPL: 1.8 (CALC) (ref 1–2.5)
ALP SERPL-CCNC: 79 U/L (ref 35–144)
ALT SERPL-CCNC: 16 U/L (ref 9–46)
AST SERPL-CCNC: 18 U/L (ref 10–35)
BASOPHILS # BLD AUTO: 38 CELLS/UL (ref 0–200)
BASOPHILS NFR BLD AUTO: 0.6 %
BILIRUB SERPL-MCNC: 0.6 MG/DL (ref 0.2–1.2)
BUN SERPL-MCNC: 21 MG/DL (ref 7–25)
BUN/CREAT SERPL: 17 (CALC) (ref 6–22)
CALCIUM SERPL-MCNC: 10 MG/DL (ref 8.6–10.3)
CHLORIDE SERPL-SCNC: 104 MMOL/L (ref 98–110)
CHOLEST SERPL-MCNC: 159 MG/DL
CHOLEST/HDLC SERPL: 3.8 (CALC)
CO2 SERPL-SCNC: 28 MMOL/L (ref 20–32)
CREAT SERPL-MCNC: 1.27 MG/DL (ref 0.7–1.18)
CREAT UR-MCNC: 47 MG/DL (ref 20–320)
EOSINOPHIL # BLD AUTO: 416 CELLS/UL (ref 15–500)
EOSINOPHIL NFR BLD AUTO: 6.5 %
ERYTHROCYTE [DISTWIDTH] IN BLOOD BY AUTOMATED COUNT: 13 % (ref 11–15)
EST. AVERAGE GLUCOSE BLD GHB EST-MCNC: 143 MG/DL
EST. AVERAGE GLUCOSE BLD GHB EST-SCNC: 7.9 MMOL/L
GLOBULIN SER CALC-MCNC: 2.5 G/DL (CALC) (ref 1.9–3.7)
GLUCOSE SERPL-MCNC: 128 MG/DL (ref 65–99)
HBA1C MFR BLD: 6.6 % OF TOTAL HGB
HCT VFR BLD AUTO: 38.1 % (ref 38.5–50)
HDLC SERPL-MCNC: 42 MG/DL
HGB BLD-MCNC: 12.5 G/DL (ref 13.2–17.1)
LDLC SERPL CALC-MCNC: 91 MG/DL (CALC)
LYMPHOCYTES # BLD AUTO: 1363 CELLS/UL (ref 850–3900)
LYMPHOCYTES NFR BLD AUTO: 21.3 %
MCH RBC QN AUTO: 30.6 PG (ref 27–33)
MCHC RBC AUTO-ENTMCNC: 32.8 G/DL (ref 32–36)
MCV RBC AUTO: 93.4 FL (ref 80–100)
MICROALBUMIN UR-MCNC: 8 MG/DL
MONOCYTES # BLD AUTO: 768 CELLS/UL (ref 200–950)
MONOCYTES NFR BLD AUTO: 12 %
NEUTROPHILS # BLD AUTO: 3814 CELLS/UL (ref 1500–7800)
NEUTROPHILS NFR BLD AUTO: 59.6 %
NONHDLC SERPL-MCNC: 117 MG/DL (CALC)
PLATELET # BLD AUTO: 222 THOUSAND/UL (ref 140–400)
PMV BLD REES-ECKER: 10.5 FL (ref 7.5–12.5)
POTASSIUM SERPL-SCNC: 4.1 MMOL/L (ref 3.5–5.3)
PROT SERPL-MCNC: 7 G/DL (ref 6.1–8.1)
RBC # BLD AUTO: 4.08 MILLION/UL (ref 4.2–5.8)
SL AMB EGFR AFRICAN AMERICAN: 63 ML/MIN/1.73M2
SL AMB EGFR NON AFRICAN AMERICAN: 55 ML/MIN/1.73M2
SODIUM SERPL-SCNC: 140 MMOL/L (ref 135–146)
TRIGL SERPL-MCNC: 163 MG/DL
TSH SERPL-ACNC: 7.4 MIU/L (ref 0.4–4.5)
WBC # BLD AUTO: 6.4 THOUSAND/UL (ref 3.8–10.8)

## 2021-12-10 ENCOUNTER — DOCUMENTATION (OUTPATIENT)
Dept: ENDOCRINOLOGY | Facility: HOSPITAL | Age: 76
End: 2021-12-10

## 2021-12-10 ENCOUNTER — OFFICE VISIT (OUTPATIENT)
Dept: ENDOCRINOLOGY | Facility: HOSPITAL | Age: 76
End: 2021-12-10
Payer: MEDICARE

## 2021-12-10 VITALS
HEIGHT: 70 IN | HEART RATE: 54 BPM | WEIGHT: 194.8 LBS | DIASTOLIC BLOOD PRESSURE: 72 MMHG | SYSTOLIC BLOOD PRESSURE: 130 MMHG | BODY MASS INDEX: 27.89 KG/M2

## 2021-12-10 DIAGNOSIS — I10 ESSENTIAL HYPERTENSION: ICD-10-CM

## 2021-12-10 DIAGNOSIS — E78.5 HYPERLIPIDEMIA, UNSPECIFIED HYPERLIPIDEMIA TYPE: ICD-10-CM

## 2021-12-10 DIAGNOSIS — E11.9 TYPE 2 DIABETES MELLITUS WITHOUT COMPLICATION, WITHOUT LONG-TERM CURRENT USE OF INSULIN (HCC): Primary | ICD-10-CM

## 2021-12-10 PROCEDURE — 99214 OFFICE O/P EST MOD 30 MIN: CPT | Performed by: INTERNAL MEDICINE

## 2021-12-13 ENCOUNTER — TELEPHONE (OUTPATIENT)
Dept: ADMINISTRATIVE | Facility: OTHER | Age: 76
End: 2021-12-13

## 2022-03-02 ENCOUNTER — TELEPHONE (OUTPATIENT)
Dept: ENDOCRINOLOGY | Facility: HOSPITAL | Age: 77
End: 2022-03-02

## 2022-03-02 NOTE — TELEPHONE ENCOUNTER
It actually looks like Dr Francie Kumari has not refilled the medication recently, but it should be the extended release  I did send a new script over the pharmacy

## 2022-03-02 NOTE — TELEPHONE ENCOUNTER
Patients wife left a message  Dr Angel Corona prescribed Metformin  mg tablets  Ronald Vitale was previously on the extended release and the pharmacy just dispensed the immediate release  She would like clarification on what he should be taking   Please advise

## 2022-03-03 NOTE — TELEPHONE ENCOUNTER
Spoke with patient  The script he receive was written by his PCP  Patient aware the correct script was ordered

## 2022-05-27 LAB
ALBUMIN SERPL-MCNC: 4.5 G/DL (ref 3.6–5.1)
ALBUMIN/CREAT UR: 328 MCG/MG CREAT
ALBUMIN/GLOB SERPL: 1.7 (CALC) (ref 1–2.5)
ALP SERPL-CCNC: 80 U/L (ref 35–144)
ALT SERPL-CCNC: 14 U/L (ref 9–46)
AST SERPL-CCNC: 20 U/L (ref 10–35)
BASOPHILS # BLD AUTO: 28 CELLS/UL (ref 0–200)
BASOPHILS NFR BLD AUTO: 0.4 %
BILIRUB SERPL-MCNC: 0.7 MG/DL (ref 0.2–1.2)
BUN SERPL-MCNC: 25 MG/DL (ref 7–25)
BUN/CREAT SERPL: ABNORMAL (CALC) (ref 6–22)
CALCIUM SERPL-MCNC: 9.9 MG/DL (ref 8.6–10.3)
CHLORIDE SERPL-SCNC: 106 MMOL/L (ref 98–110)
CHOLEST SERPL-MCNC: 150 MG/DL
CHOLEST/HDLC SERPL: 3.7 (CALC)
CO2 SERPL-SCNC: 25 MMOL/L (ref 20–32)
CREAT SERPL-MCNC: 1.15 MG/DL (ref 0.7–1.18)
CREAT UR-MCNC: 39 MG/DL (ref 20–320)
EOSINOPHIL # BLD AUTO: 359 CELLS/UL (ref 15–500)
EOSINOPHIL NFR BLD AUTO: 5.2 %
ERYTHROCYTE [DISTWIDTH] IN BLOOD BY AUTOMATED COUNT: 13.3 % (ref 11–15)
GLOBULIN SER CALC-MCNC: 2.6 G/DL (CALC) (ref 1.9–3.7)
GLUCOSE SERPL-MCNC: 106 MG/DL (ref 65–99)
HBA1C MFR BLD: 6.6 % OF TOTAL HGB
HCT VFR BLD AUTO: 37.8 % (ref 38.5–50)
HDLC SERPL-MCNC: 41 MG/DL
HGB BLD-MCNC: 12.7 G/DL (ref 13.2–17.1)
LDLC SERPL CALC-MCNC: 87 MG/DL (CALC)
LYMPHOCYTES # BLD AUTO: 1283 CELLS/UL (ref 850–3900)
LYMPHOCYTES NFR BLD AUTO: 18.6 %
MCH RBC QN AUTO: 30.5 PG (ref 27–33)
MCHC RBC AUTO-ENTMCNC: 33.6 G/DL (ref 32–36)
MCV RBC AUTO: 90.9 FL (ref 80–100)
MICROALBUMIN UR-MCNC: 12.8 MG/DL
MONOCYTES # BLD AUTO: 683 CELLS/UL (ref 200–950)
MONOCYTES NFR BLD AUTO: 9.9 %
NEUTROPHILS # BLD AUTO: 4547 CELLS/UL (ref 1500–7800)
NEUTROPHILS NFR BLD AUTO: 65.9 %
NONHDLC SERPL-MCNC: 109 MG/DL (CALC)
PLATELET # BLD AUTO: 208 THOUSAND/UL (ref 140–400)
PMV BLD REES-ECKER: 10.5 FL (ref 7.5–12.5)
POTASSIUM SERPL-SCNC: 4.4 MMOL/L (ref 3.5–5.3)
PROT SERPL-MCNC: 7.1 G/DL (ref 6.1–8.1)
RBC # BLD AUTO: 4.16 MILLION/UL (ref 4.2–5.8)
SL AMB EGFR AFRICAN AMERICAN: 71 ML/MIN/1.73M2
SL AMB EGFR NON AFRICAN AMERICAN: 61 ML/MIN/1.73M2
SODIUM SERPL-SCNC: 142 MMOL/L (ref 135–146)
TRIGL SERPL-MCNC: 122 MG/DL
TSH SERPL-ACNC: 2.11 MIU/L (ref 0.4–4.5)
WBC # BLD AUTO: 6.9 THOUSAND/UL (ref 3.8–10.8)

## 2022-06-03 ENCOUNTER — OFFICE VISIT (OUTPATIENT)
Dept: ENDOCRINOLOGY | Facility: HOSPITAL | Age: 77
End: 2022-06-03
Payer: MEDICARE

## 2022-06-03 VITALS
WEIGHT: 190 LBS | HEIGHT: 70 IN | HEART RATE: 48 BPM | DIASTOLIC BLOOD PRESSURE: 66 MMHG | BODY MASS INDEX: 27.2 KG/M2 | OXYGEN SATURATION: 98 % | SYSTOLIC BLOOD PRESSURE: 128 MMHG

## 2022-06-03 DIAGNOSIS — E78.5 HYPERLIPIDEMIA, UNSPECIFIED HYPERLIPIDEMIA TYPE: ICD-10-CM

## 2022-06-03 DIAGNOSIS — E11.9 TYPE 2 DIABETES MELLITUS WITHOUT COMPLICATION, WITHOUT LONG-TERM CURRENT USE OF INSULIN (HCC): Primary | ICD-10-CM

## 2022-06-03 DIAGNOSIS — I10 ESSENTIAL HYPERTENSION: ICD-10-CM

## 2022-06-03 PROCEDURE — 99214 OFFICE O/P EST MOD 30 MIN: CPT | Performed by: INTERNAL MEDICINE

## 2022-06-03 NOTE — PROGRESS NOTES
6/3/2022    Assessment/Plan      Diagnoses and all orders for this visit:    Type 2 diabetes mellitus without complication, without long-term current use of insulin (HCC)  -     Hemoglobin A1C; Future  -     Comprehensive metabolic panel; Future  -     CBC and differential; Future  -     Microalbumin / creatinine urine ratio; Future  -     TSH, 3rd generation; Future    Essential hypertension    Hyperlipidemia, unspecified hyperlipidemia type  -     Lipid Panel with Direct LDL reflex; Future        Assessment/Plan:  1  Type 2 diabetes:  Overall remains well controlled on current regimen  Continue current regimen and follow-up in 6 months with labs just prior  2  Hypertension: Doing well on current regimen  3  Hyperlipidemia:  Well controlled on current regimen  4  Microalbuminuria: This is higher but may have been exercising prior to this  Will check it again next time and if still higher, consider medication adjustment  In this regard can consider adjusting ACE-inhibitor or addition of calcium channel blocker or SGL T2 inhibitor  CC: Diabetes follow-up    History of Present Illness     HPI: Gearldean Brittle is a 68y o  year old male with type 2 diabetes for about 15-20 years  He is on oral agents at home and takes Metformin  mg bid, glimepiride 2 mg daily, Januvia 100 mg daily  He denies any polyuria, polydipsia, nocturia and blurry vision  He denies neuropathy, nephropathy and retinopathy  Hypoglycemic episodes: No      The patient's last eye exam was in June 2021  The patient's last foot exam was in Dec 2021  Blood Sugar/Glucometer/Pump/CGM review: No logs to review today  For hx of htn, continues on ramipril 10 mg bid  For hld, continues on atorvastatin 40 mg daily  Review of Systems   Constitutional: Negative for fatigue  HENT: Negative for trouble swallowing and voice change  Eyes: Negative for visual disturbance     Respiratory: Negative for shortness of breath  Cardiovascular: Negative for palpitations and leg swelling  Gastrointestinal: Negative for abdominal pain, nausea and vomiting  Endocrine: Negative for polydipsia and polyuria  Musculoskeletal: Negative for arthralgias and myalgias  Skin: Negative for rash  Neurological: Negative for dizziness, tremors and weakness  Hematological: Negative for adenopathy  Psychiatric/Behavioral: Negative for agitation and confusion  Historical Information   History reviewed  No pertinent past medical history  History reviewed  No pertinent surgical history    Social History   Social History     Substance and Sexual Activity   Alcohol Use None     Social History     Substance and Sexual Activity   Drug Use Not on file     Social History     Tobacco Use   Smoking Status Former Smoker    Packs/day: 1 00    Types: Cigarettes    Quit date: 1    Years since quittin 4   Smokeless Tobacco Never Used     Family History:   Family History   Problem Relation Age of Onset   Valtone Garcia Breast cancer Mother     Hypertension Mother     Diabetes unspecified Mother     Heart disease Father        Meds/Allergies   Current Outpatient Medications   Medication Sig Dispense Refill    Ascorbic Acid (VITAMIN C) 1000 MG tablet Take 1,000 mg by mouth daily      atorvastatin (LIPITOR) 40 mg tablet Take 1 tablet (40 mg total) by mouth daily 1 tab daily 90 tablet 3    Blood Glucose Monitoring Suppl (ACCU-CHEK FAWN PLUS) w/Device KIT Check sugars once a day 1 kit 0    Blood Glucose Monitoring Suppl (ACCU-CHEK FAWN PLUS) w/Device KIT Check blood sugars once daily 1 kit 0    Cholecalciferol (VITAMIN D3) 400 units CAPS Take by mouth      Coenzyme Q10 (COQ10 PO) Take by mouth      FOLIC ACID PO Take by mouth      glimepiride (AMARYL) 2 mg tablet TAKE 1 TABLET BY MOUTH EVERY DAY 90 tablet 3    glucose blood (Accu-Chek Fawn Plus) test strip TEST ONCE A  each 6    Januvia 100 MG tablet Take 1 tablet (100 mg total) by mouth daily 1 tab daily 90 tablet 1    Lactobacillus (PROBIOTIC ACIDOPHILUS PO) Take by mouth      Magnesium 500 MG CAPS Take 500 mg by mouth daily      metFORMIN (GLUCOPHAGE-XR) 500 mg 24 hr tablet TAKE 1 TABLET BY MOUTH TWICE A  tablet 1    metoprolol succinate (TOPROL-XL) 50 mg 24 hr tablet Take 1 tablet (50 mg total) by mouth daily 90 tablet 3    OMEGA-3 KRILL OIL PO Take by mouth      ramipril (ALTACE) 10 MG capsule 1 tab bid 180 capsule 3    Turmeric 500 MG CAPS Take by mouth      Zinc Sulfate (ZINC 15 PO) Take by mouth       No current facility-administered medications for this visit  No Known Allergies    Objective   Vitals: Blood pressure 128/66, pulse (!) 48, height 5' 10" (1 778 m), weight 86 2 kg (190 lb), SpO2 98 %  Invasive Devices  Report    None                 Physical Exam  Vitals reviewed  Constitutional:       General: He is not in acute distress  Appearance: He is well-developed  He is not diaphoretic  HENT:      Head: Normocephalic and atraumatic  Eyes:      Conjunctiva/sclera: Conjunctivae normal       Pupils: Pupils are equal, round, and reactive to light  Neck:      Thyroid: No thyromegaly  Cardiovascular:      Rate and Rhythm: Normal rate and regular rhythm  Pulmonary:      Effort: Pulmonary effort is normal  No respiratory distress  Breath sounds: Normal breath sounds  Abdominal:      General: Bowel sounds are normal       Palpations: Abdomen is soft  Musculoskeletal:         General: Normal range of motion  Cervical back: Normal range of motion and neck supple  Skin:     General: Skin is warm and dry  Findings: No rash  Neurological:      Mental Status: He is alert and oriented to person, place, and time  Motor: No abnormal muscle tone  Psychiatric:         Behavior: Behavior normal          The history was obtained from the review of the chart and from the patient      Lab Results:    Most recent Alc is  Lab Results Component Value Date    HGBA1C 6 6 (H) 05/26/2022           No components found for: HA1C  No components found for: GLU    Lab Results   Component Value Date    CREATININE 1 15 05/26/2022    CREATININE 1 27 (H) 12/06/2021    CREATININE 1 24 (H) 06/16/2021    BUN 25 05/26/2022    K 4 4 05/26/2022     05/26/2022    CO2 25 05/26/2022     No results found for: EGFR  No components found for: [facility]    Lab Results   Component Value Date    HDL 41 05/26/2022    TRIG 122 05/26/2022       Lab Results   Component Value Date    ALT 14 05/26/2022    AST 20 05/26/2022    ALKPHOS 80 05/26/2022       Lab Results   Component Value Date    TSH 2 11 05/26/2022    FREET4 1 0 10/14/2019           No future appointments  Portions of the record may have been created with voice recognition software  Occasional wrong word or "sound a like" substitutions may have occurred due to the inherent limitations of voice recognition software  Read the chart carefully and recognize, using context, where substitutions have occurred

## 2022-06-06 DIAGNOSIS — E11.8 TYPE 2 DIABETES MELLITUS WITH COMPLICATION, WITHOUT LONG-TERM CURRENT USE OF INSULIN (HCC): Primary | ICD-10-CM

## 2022-06-06 DIAGNOSIS — E11.9 TYPE 2 DIABETES MELLITUS WITHOUT COMPLICATION, WITHOUT LONG-TERM CURRENT USE OF INSULIN (HCC): ICD-10-CM

## 2022-06-07 RX ORDER — SITAGLIPTIN 100 MG/1
100 TABLET, FILM COATED ORAL DAILY
Qty: 90 TABLET | Refills: 1 | Status: SHIPPED | OUTPATIENT
Start: 2022-06-07

## 2022-07-25 DIAGNOSIS — E11.9 TYPE 2 DIABETES MELLITUS WITHOUT COMPLICATION, WITHOUT LONG-TERM CURRENT USE OF INSULIN (HCC): ICD-10-CM

## 2022-07-25 RX ORDER — METFORMIN HYDROCHLORIDE 500 MG/1
TABLET, EXTENDED RELEASE ORAL
Qty: 180 TABLET | Refills: 1 | Status: SHIPPED | OUTPATIENT
Start: 2022-07-25

## 2022-12-02 LAB
ALBUMIN SERPL-MCNC: 4.3 G/DL (ref 3.6–5.1)
ALBUMIN/CREAT UR: 217 MCG/MG CREAT
ALBUMIN/GLOB SERPL: 1.7 (CALC) (ref 1–2.5)
ALP SERPL-CCNC: 74 U/L (ref 35–144)
ALT SERPL-CCNC: 17 U/L (ref 9–46)
AST SERPL-CCNC: 18 U/L (ref 10–35)
BASOPHILS # BLD AUTO: 31 CELLS/UL (ref 0–200)
BASOPHILS NFR BLD AUTO: 0.5 %
BILIRUB SERPL-MCNC: 0.7 MG/DL (ref 0.2–1.2)
BUN SERPL-MCNC: 22 MG/DL (ref 7–25)
BUN/CREAT SERPL: ABNORMAL (CALC) (ref 6–22)
CALCIUM SERPL-MCNC: 9.7 MG/DL (ref 8.6–10.3)
CHLORIDE SERPL-SCNC: 102 MMOL/L (ref 98–110)
CHOLEST SERPL-MCNC: 154 MG/DL
CHOLEST/HDLC SERPL: 3.9 (CALC)
CO2 SERPL-SCNC: 27 MMOL/L (ref 20–32)
CREAT SERPL-MCNC: 1.12 MG/DL (ref 0.7–1.28)
CREAT UR-MCNC: 60 MG/DL (ref 20–320)
EOSINOPHIL # BLD AUTO: 366 CELLS/UL (ref 15–500)
EOSINOPHIL NFR BLD AUTO: 5.9 %
ERYTHROCYTE [DISTWIDTH] IN BLOOD BY AUTOMATED COUNT: 13.2 % (ref 11–15)
GFR/BSA.PRED SERPLBLD CYS-BASED-ARV: 68 ML/MIN/1.73M2
GLOBULIN SER CALC-MCNC: 2.5 G/DL (CALC) (ref 1.9–3.7)
GLUCOSE SERPL-MCNC: 167 MG/DL (ref 65–99)
HBA1C MFR BLD: 7.1 % OF TOTAL HGB
HCT VFR BLD AUTO: 37.1 % (ref 38.5–50)
HDLC SERPL-MCNC: 39 MG/DL
HGB BLD-MCNC: 12.4 G/DL (ref 13.2–17.1)
LDLC SERPL CALC-MCNC: 87 MG/DL (CALC)
LYMPHOCYTES # BLD AUTO: 1209 CELLS/UL (ref 850–3900)
LYMPHOCYTES NFR BLD AUTO: 19.5 %
MCH RBC QN AUTO: 31 PG (ref 27–33)
MCHC RBC AUTO-ENTMCNC: 33.4 G/DL (ref 32–36)
MCV RBC AUTO: 92.8 FL (ref 80–100)
MICROALBUMIN UR-MCNC: 13 MG/DL
MONOCYTES # BLD AUTO: 657 CELLS/UL (ref 200–950)
MONOCYTES NFR BLD AUTO: 10.6 %
NEUTROPHILS # BLD AUTO: 3937 CELLS/UL (ref 1500–7800)
NEUTROPHILS NFR BLD AUTO: 63.5 %
NONHDLC SERPL-MCNC: 115 MG/DL (CALC)
PLATELET # BLD AUTO: 202 THOUSAND/UL (ref 140–400)
PMV BLD REES-ECKER: 10.8 FL (ref 7.5–12.5)
POTASSIUM SERPL-SCNC: 4.7 MMOL/L (ref 3.5–5.3)
PROT SERPL-MCNC: 6.8 G/DL (ref 6.1–8.1)
RBC # BLD AUTO: 4 MILLION/UL (ref 4.2–5.8)
SODIUM SERPL-SCNC: 136 MMOL/L (ref 135–146)
TRIGL SERPL-MCNC: 190 MG/DL
TSH SERPL-ACNC: 6.34 MIU/L (ref 0.4–4.5)
WBC # BLD AUTO: 6.2 THOUSAND/UL (ref 3.8–10.8)

## 2022-12-08 ENCOUNTER — OFFICE VISIT (OUTPATIENT)
Dept: ENDOCRINOLOGY | Facility: HOSPITAL | Age: 77
End: 2022-12-08

## 2022-12-08 VITALS
DIASTOLIC BLOOD PRESSURE: 70 MMHG | HEIGHT: 70 IN | WEIGHT: 191.2 LBS | SYSTOLIC BLOOD PRESSURE: 120 MMHG | BODY MASS INDEX: 27.37 KG/M2 | HEART RATE: 56 BPM

## 2022-12-08 DIAGNOSIS — E11.9 TYPE 2 DIABETES MELLITUS WITHOUT COMPLICATION, WITHOUT LONG-TERM CURRENT USE OF INSULIN (HCC): ICD-10-CM

## 2022-12-08 DIAGNOSIS — I10 HYPERTENSION, UNSPECIFIED TYPE: ICD-10-CM

## 2022-12-08 DIAGNOSIS — E11.8 TYPE 2 DIABETES MELLITUS WITH COMPLICATION, WITHOUT LONG-TERM CURRENT USE OF INSULIN (HCC): ICD-10-CM

## 2022-12-08 DIAGNOSIS — E78.5 HYPERLIPIDEMIA, UNSPECIFIED HYPERLIPIDEMIA TYPE: ICD-10-CM

## 2022-12-08 DIAGNOSIS — I10 ESSENTIAL HYPERTENSION: ICD-10-CM

## 2022-12-08 RX ORDER — SITAGLIPTIN 100 MG/1
100 TABLET, FILM COATED ORAL DAILY
Qty: 90 TABLET | Refills: 1 | Status: SHIPPED | OUTPATIENT
Start: 2022-12-08

## 2022-12-08 RX ORDER — RAMIPRIL 10 MG/1
CAPSULE ORAL
Qty: 180 CAPSULE | Refills: 3 | Status: SHIPPED | OUTPATIENT
Start: 2022-12-08

## 2022-12-08 RX ORDER — METOPROLOL SUCCINATE 50 MG/1
50 TABLET, EXTENDED RELEASE ORAL DAILY
Qty: 90 TABLET | Refills: 3 | Status: SHIPPED | OUTPATIENT
Start: 2022-12-08

## 2022-12-08 RX ORDER — ATORVASTATIN CALCIUM 40 MG/1
40 TABLET, FILM COATED ORAL DAILY
Qty: 90 TABLET | Refills: 3 | Status: SHIPPED | OUTPATIENT
Start: 2022-12-08

## 2022-12-08 RX ORDER — GLIMEPIRIDE 2 MG/1
TABLET ORAL
Qty: 90 TABLET | Refills: 3 | Status: SHIPPED | OUTPATIENT
Start: 2022-12-08

## 2022-12-08 RX ORDER — METFORMIN HYDROCHLORIDE 500 MG/1
TABLET, EXTENDED RELEASE ORAL
Qty: 180 TABLET | Refills: 1 | Status: SHIPPED | OUTPATIENT
Start: 2022-12-08

## 2022-12-08 NOTE — PROGRESS NOTES
Established Patient Progress Note       Chief Complaint   Patient presents with   • Diabetes Type 2        History of Present Illness:     Aubrie Kirkland is a 68 y o  male with a history of T2DM since 20+ years on 3 oral AHD, hypertension, hyperlipidemia without any macrovascular complications or microvascular complications of neuropathy, retinopathy  Does have microalbuminuria with CKD3 (last urine studies progressing to macroalbuminuria)  other PMHX of hypertension and hyperlipidemia  Presents today for diabetic care  She denies any polyuria, polydipsia, nocturia and blurry vision  Since last visit, he visited his son in [place]  While there was not being compliant with diet/exercise  Also had recent surgery for left hand Dupuytren contracture and to add to this while in [place] patient also had a fall leading to back injury and had back brace which also limited his mobility  Currently motivated to be more active and improve his A1c  Blood Sugar/Glucometer/Pump/CGM review:  Does not check BG  Current regime:- metformin 500mg XR BID, Glimepride 2mg daily, Januvia 100mg daily - compliant with medications without any side effects  (been on metformin initially 500mg daily, increased to BID few years ago  DEAN and [place] and [place] new medications added within last 5 years  Reports seeing his HbA1C improve by a lot since starting DEAN)  Medications tried/failed in past:- Above  Hypoglycemic episodes: None  Diet- Is mindful of what he eats but does not follow a diabetic diet  Activity- Is very active at baseline, but recently not so active d/t back issues  But says this is improving  Weight - stable    last eye exam 06/2022- normal    last foot exam done during today's visit 12/2022  History of hypertension:- Patient is currently on Ramipril 10 mg as well as metoprolol 50 mg  Managed by PCP  History of hyperlipidemia:-Does have issues with hypertriglyceridemia    Compliant with Lipitor 40 mg daily    Patient Active Problem List   Diagnosis   • Hyperlipidemia   • Essential hypertension   • Type 2 diabetes mellitus without complication, without long-term current use of insulin ([facility]      No past medical history on file     Past Surgical History:   Procedure Laterality Date   • FINGER CONTRACTURE RELEASE        Family History   Problem Relation Age of Onset   • Breast cancer Mother    • Hypertension Mother    • Diabetes unspecified Mother    • Heart disease Father      Social History     Tobacco Use   • Smoking status: Former     Packs/day: 1 00     Types: Cigarettes     Quit date:      Years since quittin 9   • Smokeless tobacco: Never   Substance Use Topics   • Alcohol use: Not on file     No Known Allergies    Current Outpatient Medications:   •  Ascorbic Acid (VITAMIN C) 1000 MG tablet, Take 1,000 mg by mouth daily, Disp: , Rfl:   •  atorvastatin (LIPITOR) 40 mg tablet, Take 1 tablet (40 mg total) by mouth daily 1 tab daily, Disp: 90 tablet, Rfl: 3  •  Blood Glucose Monitoring Suppl (ACCU-CHEK DEBORAH PLUS) w/Device KIT, Check sugars once a day, Disp: 1 kit, Rfl: 0  •  Blood Glucose Monitoring Suppl (ACCU-CHEK DEBORAH PLUS) w/Device KIT, Check blood sugars once daily, Disp: 1 kit, Rfl: 0  •  Cholecalciferol (VITAMIN D3) 400 units CAPS, Take by mouth, Disp: , Rfl:   •  Coenzyme Q10 (COQ10 PO), Take by mouth, Disp: , Rfl:   •  FOLIC ACID PO, Take by mouth, Disp: , Rfl:   •  glimepiride (AMARYL) 2 mg tablet, TAKE 1 TABLET BY MOUTH EVERY DAY, Disp: 90 tablet, Rfl: 3  •  glucose blood (Accu-Chek Deborah Plus) test strip, TEST ONCE A DAY, Disp: 100 each, Rfl: 6  •  Januvia 100 MG tablet, Take 1 tablet (100 mg total) by mouth daily 1 tab daily, Disp: 90 tablet, Rfl: 1  •  Lactobacillus (PROBIOTIC ACIDOPHILUS PO), Take by mouth, Disp: , Rfl:   •  Magnesium 500 MG CAPS, Take 500 mg by mouth daily, Disp: , Rfl:   •  metFORMIN (GLUCOPHAGE-XR) 500 mg 24 hr tablet, TAKE 1 TABLET BY MOUTH TWICE A DAY, Disp: 180 tablet, Rfl: 1  • metoprolol succinate (TOPROL-XL) 50 mg 24 hr tablet, Take 1 tablet (50 mg total) by mouth daily, Disp: 90 tablet, Rfl: 3  •  Multiple Vitamin (MULTIVITAMIN ADULT PO), Take 1 capsule by mouth daily, Disp: , Rfl:   •  OMEGA-3 KRILL OIL PO, Take by mouth, Disp: , Rfl:   •  ramipril (ALTACE) 10 MG capsule, 1 tab bid, Disp: 180 capsule, Rfl: 3  •  Turmeric 500 MG CAPS, Take by mouth, Disp: , Rfl:   •  Zinc Sulfate (ZINC 15 PO), Take by mouth, Disp: , Rfl:     Review of Systems   Constitutional: Negative for diaphoresis, fatigue and unexpected weight change  Respiratory: Negative for shortness of breath  Cardiovascular: Negative for chest pain and palpitations  Gastrointestinal: Negative for constipation and diarrhea  Endocrine: Negative for polydipsia and polyuria  Physical Exam:  Body mass index is 27 43 kg/m²  /70   Pulse 56   Ht 5' 10" (1 778 m)   Wt 86 7 kg (191 lb 3 2 oz)   BMI 27 43 kg/m²    Wt Readings from Last 3 Encounters:   12/08/22 86 7 kg (191 lb 3 2 oz)   06/03/22 86 2 kg (190 lb)   12/10/21 88 4 kg (194 lb 12 8 oz)     Physical Exam  Constitutional:       Appearance: Normal appearance  Cardiovascular:      Rate and Rhythm: Normal rate and regular rhythm  Pulses: Normal pulses  no weak pulses          Dorsalis pedis pulses are 2+ on the right side and 2+ on the left side  Pulmonary:      Effort: Pulmonary effort is normal    Abdominal:      General: Abdomen is flat  Palpations: Abdomen is soft  Feet:      Right foot:      Skin integrity: No ulcer, skin breakdown, erythema, warmth, callus or dry skin  Left foot:      Skin integrity: No ulcer, skin breakdown, erythema, warmth, callus or dry skin  Skin:     General: Skin is warm  Capillary Refill: Capillary refill takes less than 2 seconds  Neurological:      General: No focal deficit present  Mental Status: He is alert  Patient's shoes and socks removed      Right Foot/Ankle   Right Foot Inspection  Skin Exam: skin normal and skin intact  No dry skin, no warmth, no callus, no erythema, no maceration, no abnormal color, no pre-ulcer, no ulcer and no callus  Toe Exam: ROM and strength within normal limits  Sensory   Vibration: intact  Monofilament testing: intact    Vascular  Capillary refills: < 3 seconds  The right DP pulse is 2+  Left Foot/Ankle  Left Foot Inspection  Skin Exam: skin normal and skin intact  No dry skin, no warmth, no erythema, no maceration, normal color, no pre-ulcer, no ulcer and no callus  Toe Exam: ROM and strength within normal limits  Sensory   Vibration: intact  Monofilament testing: intact    Vascular  Capillary refills: < 3 seconds  The left DP pulse is 2+       Assign Risk Category  No deformity present  No loss of protective sensation  No weak pulses  Risk: 0  Labs:    Latest Reference Range & Units 12/15/18 08:21 07/08/19 07:10 10/14/19 08:00 05/22/20 07:26 11/09/20 08:25 05/27/21 08:46 12/06/21 07:10 05/26/22 08:50 12/02/22 09:07   Hemoglobin A1C <5 7 % of total Hgb 7 0 (H) 7 6 (H) 7 0 (H) 6 5 (H) 6 4 (H) 6 5 (H) 6 6 (H) 6 6 (H) 7 1 (H)   (H): Data is abnormally high   12/20/18 00:00 12/23/19 00:00 01/05/21 16:11 06/14/21 00:00   Left Eye Diabetic Retinopathy None (E) None (E) None (E) None (E)   (E): External lab result   12/20/18 00:00 12/23/19 00:00 01/05/21 16:11 06/14/21 00:00   Right Eye Diabetic Retinopathy None (E) None (E) None (E) None (E)   (E): External lab result   Latest Reference Range & Units 08/31/18 00:00 07/08/19 07:10 10/14/19 08:00 05/22/20 07:26 11/09/20 08:25 05/27/21 08:46 12/06/21 07:10 05/26/22 08:50 12/02/22 09:07   EXT Creatinine Urine 20 - 320 mg/dL 82 (E) 60 66 61 69 56 47 39 60   MICROALBUMIN/CREATININE RATIO <30 mcg/mg creat 32 (E) 107 (H) 80 (H) 56 (H) 75 (H) 82 (H) 170 (H) 328 (H) 217 (H)   MICROALBUM ,U,RANDOM See Note: mg/dL 2 6 (E) 6 4 5 3 3 4 5 2 4 6 8 0 12 8 13 0   (H): Data is abnormally high  (E): External lab result   Latest Reference Range & Units 12/06/21 07:10 05/26/22 08:50 12/02/22 09:07   Cholesterol <200 mg/dL 159 150 154   Triglycerides <150 mg/dL 163 (H) 122 190 (H)   HDL > OR = 40 mg/dL 42 41 39 (L)   Non-HDL Cholesterol <130 mg/dL (calc) 117 109 115   LDL Calculated mg/dL (calc) 91 87 87   Chol HDLC Ratio <5 0 (calc) 3 8 3 7 3 9   (H): Data is abnormally high  (L): Data is abnormally low     Latest Reference Range & Units Most Recent 07/08/19 07:10 10/14/19 08:00 05/22/20 07:26 11/09/20 08:25 05/27/21 08:46 06/16/21 07:18 12/06/21 07:10 05/26/22 08:50   eGFR African American > OR = 60 mL/min/1 73m2 71  5/26/22 08:50 71 71 60 58 (L) 60 65 63 71   eGFR Non African American > OR = 60 mL/min/1 73m2 61  5/26/22 08:50 62 62 52 (L) 50 (L) 52 (L) 56 (L) 55 (L) 61   (L): Data is abnormally low  Impression & Plan:    Problem List Items Addressed This Visit    None      No orders of the defined types were placed in this encounter  There are no Patient Instructions on file for this visit  73year-old male with history of type 2 diabetes for 20+ years currently on 3 oral antihyperglycemic medications with no macrovascular complications, with microvascular complication of microalbuminuria with CKD stage III, no neuropathy or retinopathy who presents today for diabetic care  Patient also has past medical history of hypertension, hyperlipidemia  Patient's previous A1c was 6 6% 6 months ago however has recently rise to 7 1%  Patient does not check his blood sugar  Does not have any symptoms of overt hyperglycemia at this point  Attributes rise to A1c secondary to dietary/lifestyle changes  Alternatively we also discussed today that given his CKD- III with microalbuminuria potential addition of SGLT2 antagonist would be a good option to improve his diabetic control as well as renal function  However patient would like to work on lifestyle modification at this point prior to any adjustment to his medication   He is motivated to improve this back down to <6 5% with working better on his lifestyle  This is still a reasonable option given A1c is still less than 7%  For now continue with metformin 500 mg XR twice daily, Januvia 100 mg daily, glimepiride 2 mg daily  Screening   up-to-date with retinopathy repeat screening 06/23  Up-to-date with lipid, repeat lipid panel 12/23  LDL less than 100, 87  Continue with Lipitor 40 mg daily  Up-to-date with urine microalbumin, will check repeat in 6 months as last urine microalbumin although improved from prior macroalbuminuria of greater than 300 was still high at 217  On ACE inhibitors  Given this did discuss potentially addition of SGLT2 antagonist during next visit to improve microalbuminuria/CKD if microalbuminuria persists  Hypertension :- goal blood pressure less than 130/80, currently at goal   Continue with metoprolol 50 mg, ramipril 10 mg daily  Managed by PCP    Hyperlipidemia:-Continue with Lipitor 40 mg daily    Discussed with the patient and all questioned fully answered  He will call me if any problems arise  Follow-up appointment in 6 months       Counseled patient on diagnostic results, prognosis, risk and benefit of treatment options, instruction for management, importance of treatment compliance, Risk  factor reduction and impressions      Patrick Benitez MD

## 2023-06-01 LAB
ALBUMIN/CREAT UR: 433 MCG/MG CREAT
CREAT UR-MCNC: 33 MG/DL (ref 20–320)
EST. AVERAGE GLUCOSE BLD GHB EST-MCNC: 157 MG/DL
EST. AVERAGE GLUCOSE BLD GHB EST-SCNC: 8.7 MMOL/L
HBA1C MFR BLD: 7.1 % OF TOTAL HGB
MICROALBUMIN UR-MCNC: 14.3 MG/DL

## 2023-06-08 ENCOUNTER — OFFICE VISIT (OUTPATIENT)
Dept: ENDOCRINOLOGY | Facility: HOSPITAL | Age: 78
End: 2023-06-08
Payer: MEDICARE

## 2023-06-08 VITALS
DIASTOLIC BLOOD PRESSURE: 94 MMHG | SYSTOLIC BLOOD PRESSURE: 162 MMHG | BODY MASS INDEX: 27.2 KG/M2 | HEART RATE: 57 BPM | WEIGHT: 190 LBS | HEIGHT: 70 IN

## 2023-06-08 DIAGNOSIS — I10 ESSENTIAL HYPERTENSION: ICD-10-CM

## 2023-06-08 DIAGNOSIS — E11.9 TYPE 2 DIABETES MELLITUS WITHOUT COMPLICATION, WITHOUT LONG-TERM CURRENT USE OF INSULIN (HCC): Primary | ICD-10-CM

## 2023-06-08 DIAGNOSIS — E78.5 HYPERLIPIDEMIA, UNSPECIFIED HYPERLIPIDEMIA TYPE: ICD-10-CM

## 2023-06-08 DIAGNOSIS — E11.8 TYPE 2 DIABETES MELLITUS WITH COMPLICATION, WITHOUT LONG-TERM CURRENT USE OF INSULIN (HCC): ICD-10-CM

## 2023-06-08 PROCEDURE — 99214 OFFICE O/P EST MOD 30 MIN: CPT | Performed by: NURSE PRACTITIONER

## 2023-06-08 RX ORDER — SITAGLIPTIN 100 MG/1
100 TABLET, FILM COATED ORAL DAILY
Qty: 90 TABLET | Refills: 1 | Status: SHIPPED | OUTPATIENT
Start: 2023-06-08

## 2023-06-08 RX ORDER — GLIMEPIRIDE 2 MG/1
TABLET ORAL
Qty: 90 TABLET | Refills: 3 | Status: SHIPPED | OUTPATIENT
Start: 2023-06-08

## 2023-06-08 RX ORDER — ATORVASTATIN CALCIUM 40 MG/1
40 TABLET, FILM COATED ORAL DAILY
Qty: 90 TABLET | Refills: 3 | Status: SHIPPED | OUTPATIENT
Start: 2023-06-08

## 2023-06-08 RX ORDER — METFORMIN HYDROCHLORIDE 500 MG/1
TABLET, EXTENDED RELEASE ORAL
Qty: 180 TABLET | Refills: 3 | Status: SHIPPED | OUTPATIENT
Start: 2023-06-08

## 2023-06-08 NOTE — PATIENT INSTRUCTIONS
Be mindful of diet  Stay active and stay hydrated  Check your blood sugars regularly  Continue your current diabetic medication regimen with Metformin and Januvia  Continue metoprolol and increase ramipril to 20 mg daily  Please check your blood pressure at least twice daily  Continue atorvastatin and Omega 3 Krill oil  Continue supplement with vitamin D3 daily       Obtain a diabetic eye exam

## 2023-06-08 NOTE — PROGRESS NOTES
Olivia Watson 66 y o  male MRN: [id number]    Encounter: [id number]      Assessment/Plan     Assessment: This is a 66y o -year-old male with type 2 diabetes with hypertension and hyperlipidemia  Plan:  1   Type 2 diabetes:  His hemoglobin A1c is now at goal at 6 8  He will continue metformin 1000 mg twice daily and Januvia 100 mg daily  He will continue to check his blood sugars regularly and be mindful of his diet  Check hemoglobin A1c prior to next visit      2   Hyperlipidemia:  Continue Lipitor  Check fasting lipid panel      3   Hypertension: Blood pressure is consistently elevated in the office today 160/90  For now, I have asked him to increase his ramipril to 20 mg  He does have the ability to take his blood pressure at home  I asked him to check his blood pressure in the morning and before bed and send a record to the office for review  Continue metoprolol  Check comprehensive metabolic panel prior to next visit      4   Vitamin-D deficiency:  Continue supplement with vitamin D3 daily  CC: Type 2 Diabetes follow up    History of Present Illness     HPI:  66 y  o  year old male with type 2 diabetes for over 10 years   He is on oral agents at home and takes Metformin 1000 twice daily, Glimepiride 2 mg daily and Januvia 100 daily  He denies any polyuria, polydipsia, nocturia and blurry vision   He denies neuropathy, nephropathy and retinopathy    His most recent hemoglobin A1c from May 31, 2023 is 7  1      Hypoglycemic episodes: No       The patient's last eye exam was on June 14, 2021  He has no complaints about his feet and does follow Podiatry for regular diabetic foot care with [facility]    Most recent diabetic foot exam was performed in our office on December 8, 2022      Blood Sugar/Glucometer/Pump/CGM review: Cutler Necessary of his limited blood sugar logs reveals that he is well controlled with most blood sugars under 150      For his hypertension, he is treated with ramipril 10 mg daily and metoprolol 50 mg daily      His hyperlipidemia is treated with atorvastatin 40 mg daily and Omega 3 Krill oil  Daily      For his vitamin-D deficiency, he supplements with vitamin D3 daily  Review of Systems   Constitutional: Negative  Negative for chills, fatigue and fever  HENT: Negative  Negative for trouble swallowing and voice change  Eyes: Negative for photophobia, pain, discharge, redness, itching and visual disturbance  Respiratory: Negative for cough and shortness of breath  Cardiovascular: Negative for chest pain and palpitations  Gastrointestinal: Negative for abdominal pain, constipation, diarrhea, nausea and vomiting  Endocrine: Negative for cold intolerance, heat intolerance, polydipsia, polyphagia and polyuria  Genitourinary: Negative  Musculoskeletal: Negative  Skin: Negative  Allergic/Immunologic: Negative  Neurological: Negative for dizziness, syncope, light-headedness and headaches  Hematological: Negative  Psychiatric/Behavioral: Negative  All other systems reviewed and are negative  Historical Information   History reviewed  No pertinent past medical history  Past Surgical History:   Procedure Laterality Date   • FINGER CONTRACTURE RELEASE       Social History   Social History     Substance and Sexual Activity   Alcohol Use Yes    Comment: Maybe 1 beer a week       Social History     Substance and Sexual Activity   Drug Use Never     Social History     Tobacco Use   Smoking Status Former   • Packs/day: 1 00   • Types: Cigarettes   • Quit date:    • Years since quittin 4   • Passive exposure: Past   Smokeless Tobacco Never     Family History:   Family History   Problem Relation Age of Onset   • Breast cancer Mother    • Hypertension Mother    • Diabetes unspecified Mother    • Heart disease Father        Meds/Allergies   Current Outpatient Medications   Medication Sig Dispense Refill   • Ascorbic Acid (VITAMIN C) 1000 MG tablet "Take 1,000 mg by mouth daily     • atorvastatin (LIPITOR) 40 mg tablet Take 1 tablet (40 mg total) by mouth daily 1 tab daily 90 tablet 3   • Blood Glucose Monitoring Suppl (ACCU-CHEK DEBORAH PLUS) w/Device KIT Check sugars once a day 1 kit 0   • Blood Glucose Monitoring Suppl (ACCU-CHEK DEBORAH PLUS) w/Device KIT Check blood sugars once daily 1 kit 0   • Cholecalciferol (VITAMIN D3) 400 units CAPS Take by mouth     • Coenzyme Q10 (COQ10 PO) Take by mouth     • FOLIC ACID PO Take by mouth     • glimepiride (AMARYL) 2 mg tablet TAKE 1 TABLET BY MOUTH EVERY DAY 90 tablet 3   • glucose blood (Accu-Chek Deborah Plus) test strip TEST ONCE A  each 6   • Januvia 100 MG tablet Take 1 tablet (100 mg total) by mouth daily 1 tab daily 90 tablet 1   • Lactobacillus (PROBIOTIC ACIDOPHILUS PO) Take by mouth     • Magnesium 500 MG CAPS Take 500 mg by mouth daily     • metFORMIN (GLUCOPHAGE-XR) 500 mg 24 hr tablet TAKE 1 TABLET BY MOUTH TWICE A  tablet 1   • metoprolol succinate (TOPROL-XL) 50 mg 24 hr tablet Take 1 tablet (50 mg total) by mouth daily 90 tablet 3   • Multiple Vitamin (MULTIVITAMIN ADULT PO) Take 1 capsule by mouth daily     • OMEGA-3 KRILL OIL PO Take by mouth     • ramipril (ALTACE) 10 MG capsule 1 tab bid 180 capsule 3   • Turmeric 500 MG CAPS Take by mouth     • Zinc Sulfate (ZINC 15 PO) Take by mouth       No current facility-administered medications for this visit  No Known Allergies    Objective   Vitals: Blood pressure 162/94, pulse 57, height 5' 10\" (1 778 m), weight 86 2 kg (190 lb)  Physical Exam  Vitals reviewed  Constitutional:       Appearance: He is well-developed  HENT:      Head: Normocephalic and atraumatic  Nose: Nose normal    Eyes:      Conjunctiva/sclera: Conjunctivae normal       Pupils: Pupils are equal, round, and reactive to light  Cardiovascular:      Rate and Rhythm: Normal rate and regular rhythm  Heart sounds: Normal heart sounds     Pulmonary:      Effort: " "Pulmonary effort is normal       Breath sounds: Normal breath sounds  Abdominal:      General: Bowel sounds are normal       Palpations: Abdomen is soft  Musculoskeletal:         General: Normal range of motion  Cervical back: Normal range of motion and neck supple  Skin:     General: Skin is warm and dry  Neurological:      Mental Status: He is alert and oriented to person, place, and time  Psychiatric:         Behavior: Behavior normal          Thought Content: Thought content normal          Judgment: Judgment normal        Lab Results:   Lab Results   Component Value Date/Time    Albumin 4 3 12/02/2022 09:07 AM    ALT 17 12/02/2022 09:07 AM    AST 18 12/02/2022 09:07 AM    BUN 22 12/02/2022 09:07 AM    Chloride 102 12/02/2022 09:07 AM    CO2 27 12/02/2022 09:07 AM    Creatinine 1 12 12/02/2022 09:07 AM    Globulin 2 5 12/02/2022 09:07 AM    HCT 37 1 (L) 12/02/2022 09:07 AM    HDL 39 (L) 12/02/2022 09:07 AM    Hemoglobin 12 4 (L) 12/02/2022 09:07 AM    Hemoglobin A1C 7 1 (H) 05/31/2023 10:25 AM    Hemoglobin A1C 7 1 (H) 12/02/2022 09:07 AM    Potassium 4 7 12/02/2022 09:07 AM    MCV 92 8 12/02/2022 09:07 AM    Platelet Count 167 88/37/5836 09:07 AM    Protein, Total 6 8 12/02/2022 09:07 AM    Triglycerides 190 (H) 12/02/2022 09:07 AM    White Blood Cell Count 6 2 12/02/2022 09:07 AM       Portions of the record may have been created with voice recognition software  Occasional wrong word or \"sound a like\" substitutions may have occurred due to the inherent limitations of voice recognition software  Read the chart carefully and recognize, using context, where substitutions have occurred    "

## 2023-06-11 LAB
ALBUMIN SERPL-MCNC: 4.4 G/DL (ref 3.6–5.1)
ALBUMIN/GLOB SERPL: 1.9 (CALC) (ref 1–2.5)
ALP SERPL-CCNC: 69 U/L (ref 35–144)
ALT SERPL-CCNC: 18 U/L (ref 9–46)
AST SERPL-CCNC: 17 U/L (ref 10–35)
BASOPHILS # BLD AUTO: 29 CELLS/UL (ref 0–200)
BASOPHILS NFR BLD AUTO: 0.5 %
BILIRUB SERPL-MCNC: 0.7 MG/DL (ref 0.2–1.2)
BUN SERPL-MCNC: 17 MG/DL (ref 7–25)
BUN/CREAT SERPL: ABNORMAL (CALC) (ref 6–22)
CALCIUM SERPL-MCNC: 9.5 MG/DL (ref 8.6–10.3)
CHLORIDE SERPL-SCNC: 104 MMOL/L (ref 98–110)
CHOLEST SERPL-MCNC: 155 MG/DL
CHOLEST/HDLC SERPL: 3.9 (CALC)
CO2 SERPL-SCNC: 27 MMOL/L (ref 20–32)
CREAT SERPL-MCNC: 1.09 MG/DL (ref 0.7–1.28)
EOSINOPHIL # BLD AUTO: 359 CELLS/UL (ref 15–500)
EOSINOPHIL NFR BLD AUTO: 6.3 %
ERYTHROCYTE [DISTWIDTH] IN BLOOD BY AUTOMATED COUNT: 13 % (ref 11–15)
GFR/BSA.PRED SERPLBLD CYS-BASED-ARV: 69 ML/MIN/1.73M2
GLOBULIN SER CALC-MCNC: 2.3 G/DL (CALC) (ref 1.9–3.7)
GLUCOSE SERPL-MCNC: 159 MG/DL (ref 65–99)
HCT VFR BLD AUTO: 37 % (ref 38.5–50)
HDLC SERPL-MCNC: 40 MG/DL
HGB BLD-MCNC: 12.6 G/DL (ref 13.2–17.1)
LDLC SERPL CALC-MCNC: 88 MG/DL (CALC)
LYMPHOCYTES # BLD AUTO: 1106 CELLS/UL (ref 850–3900)
LYMPHOCYTES NFR BLD AUTO: 19.4 %
MCH RBC QN AUTO: 31.5 PG (ref 27–33)
MCHC RBC AUTO-ENTMCNC: 34.1 G/DL (ref 32–36)
MCV RBC AUTO: 92.5 FL (ref 80–100)
MONOCYTES # BLD AUTO: 621 CELLS/UL (ref 200–950)
MONOCYTES NFR BLD AUTO: 10.9 %
NEUTROPHILS # BLD AUTO: 3585 CELLS/UL (ref 1500–7800)
NEUTROPHILS NFR BLD AUTO: 62.9 %
NONHDLC SERPL-MCNC: 115 MG/DL (CALC)
PLATELET # BLD AUTO: 218 THOUSAND/UL (ref 140–400)
PMV BLD REES-ECKER: 10.3 FL (ref 7.5–12.5)
POTASSIUM SERPL-SCNC: 4.4 MMOL/L (ref 3.5–5.3)
PROT SERPL-MCNC: 6.7 G/DL (ref 6.1–8.1)
RBC # BLD AUTO: 4 MILLION/UL (ref 4.2–5.8)
SODIUM SERPL-SCNC: 139 MMOL/L (ref 135–146)
TRIGL SERPL-MCNC: 172 MG/DL
WBC # BLD AUTO: 5.7 THOUSAND/UL (ref 3.8–10.8)

## 2023-07-10 DIAGNOSIS — E11.9 TYPE 2 DIABETES MELLITUS WITHOUT COMPLICATION, WITHOUT LONG-TERM CURRENT USE OF INSULIN (HCC): ICD-10-CM

## 2023-07-10 RX ORDER — METFORMIN HYDROCHLORIDE 500 MG/1
TABLET, EXTENDED RELEASE ORAL
Qty: 180 TABLET | Refills: 1 | Status: SHIPPED | OUTPATIENT
Start: 2023-07-10

## 2023-07-17 LAB
LEFT EYE DIABETIC RETINOPATHY: NORMAL
RIGHT EYE DIABETIC RETINOPATHY: NORMAL

## 2023-12-04 LAB
ALBUMIN SERPL-MCNC: 4.6 G/DL (ref 3.6–5.1)
ALBUMIN/GLOB SERPL: 1.9 (CALC) (ref 1–2.5)
ALP SERPL-CCNC: 79 U/L (ref 35–144)
ALT SERPL-CCNC: 13 U/L (ref 9–46)
AST SERPL-CCNC: 17 U/L (ref 10–35)
BASOPHILS # BLD AUTO: 28 CELLS/UL (ref 0–200)
BASOPHILS NFR BLD AUTO: 0.5 %
BILIRUB SERPL-MCNC: 0.7 MG/DL (ref 0.2–1.2)
BUN SERPL-MCNC: 20 MG/DL (ref 7–25)
BUN/CREAT SERPL: ABNORMAL (CALC) (ref 6–22)
CALCIUM SERPL-MCNC: 9.9 MG/DL (ref 8.6–10.3)
CHLORIDE SERPL-SCNC: 103 MMOL/L (ref 98–110)
CHOLEST SERPL-MCNC: 169 MG/DL
CHOLEST/HDLC SERPL: 4 (CALC)
CO2 SERPL-SCNC: 24 MMOL/L (ref 20–32)
CREAT SERPL-MCNC: 1.18 MG/DL (ref 0.7–1.28)
EOSINOPHIL # BLD AUTO: 281 CELLS/UL (ref 15–500)
EOSINOPHIL NFR BLD AUTO: 5.1 %
ERYTHROCYTE [DISTWIDTH] IN BLOOD BY AUTOMATED COUNT: 13.2 % (ref 11–15)
EST. AVERAGE GLUCOSE BLD GHB EST-MCNC: 174 MG/DL
EST. AVERAGE GLUCOSE BLD GHB EST-SCNC: 9.7 MMOL/L
GFR/BSA.PRED SERPLBLD CYS-BASED-ARV: 63 ML/MIN/1.73M2
GLOBULIN SER CALC-MCNC: 2.4 G/DL (CALC) (ref 1.9–3.7)
GLUCOSE SERPL-MCNC: 198 MG/DL (ref 65–99)
HBA1C MFR BLD: 7.7 % OF TOTAL HGB
HCT VFR BLD AUTO: 37.5 % (ref 38.5–50)
HDLC SERPL-MCNC: 42 MG/DL
HGB BLD-MCNC: 12.5 G/DL (ref 13.2–17.1)
LDLC SERPL CALC-MCNC: 100 MG/DL (CALC)
LYMPHOCYTES # BLD AUTO: 1194 CELLS/UL (ref 850–3900)
LYMPHOCYTES NFR BLD AUTO: 21.7 %
MCH RBC QN AUTO: 30.6 PG (ref 27–33)
MCHC RBC AUTO-ENTMCNC: 33.3 G/DL (ref 32–36)
MCV RBC AUTO: 91.9 FL (ref 80–100)
MONOCYTES # BLD AUTO: 644 CELLS/UL (ref 200–950)
MONOCYTES NFR BLD AUTO: 11.7 %
NEUTROPHILS # BLD AUTO: 3355 CELLS/UL (ref 1500–7800)
NEUTROPHILS NFR BLD AUTO: 61 %
NONHDLC SERPL-MCNC: 127 MG/DL (CALC)
PLATELET # BLD AUTO: 204 THOUSAND/UL (ref 140–400)
PMV BLD REES-ECKER: 11.5 FL (ref 7.5–12.5)
POTASSIUM SERPL-SCNC: 4.8 MMOL/L (ref 3.5–5.3)
PROT SERPL-MCNC: 7 G/DL (ref 6.1–8.1)
RBC # BLD AUTO: 4.08 MILLION/UL (ref 4.2–5.8)
SODIUM SERPL-SCNC: 138 MMOL/L (ref 135–146)
TRIGL SERPL-MCNC: 174 MG/DL
TSH SERPL-ACNC: 4.47 MIU/L (ref 0.4–4.5)
WBC # BLD AUTO: 5.5 THOUSAND/UL (ref 3.8–10.8)

## 2023-12-08 ENCOUNTER — OFFICE VISIT (OUTPATIENT)
Dept: ENDOCRINOLOGY | Facility: HOSPITAL | Age: 78
End: 2023-12-08
Payer: MEDICARE

## 2023-12-08 VITALS
DIASTOLIC BLOOD PRESSURE: 90 MMHG | HEART RATE: 67 BPM | SYSTOLIC BLOOD PRESSURE: 154 MMHG | HEIGHT: 70 IN | BODY MASS INDEX: 26.66 KG/M2 | WEIGHT: 186.2 LBS

## 2023-12-08 DIAGNOSIS — E78.5 HYPERLIPIDEMIA, UNSPECIFIED HYPERLIPIDEMIA TYPE: ICD-10-CM

## 2023-12-08 DIAGNOSIS — E11.8 TYPE 2 DIABETES MELLITUS WITH COMPLICATION, WITHOUT LONG-TERM CURRENT USE OF INSULIN (HCC): ICD-10-CM

## 2023-12-08 DIAGNOSIS — E11.9 TYPE 2 DIABETES MELLITUS WITHOUT COMPLICATION, WITHOUT LONG-TERM CURRENT USE OF INSULIN (HCC): Primary | ICD-10-CM

## 2023-12-08 DIAGNOSIS — I10 ESSENTIAL HYPERTENSION: ICD-10-CM

## 2023-12-08 DIAGNOSIS — I10 HYPERTENSION, UNSPECIFIED TYPE: ICD-10-CM

## 2023-12-08 PROCEDURE — 99214 OFFICE O/P EST MOD 30 MIN: CPT | Performed by: NURSE PRACTITIONER

## 2023-12-08 RX ORDER — GLIMEPIRIDE 2 MG/1
TABLET ORAL
Qty: 90 TABLET | Refills: 3 | Status: SHIPPED | OUTPATIENT
Start: 2023-12-08

## 2023-12-08 RX ORDER — METFORMIN HYDROCHLORIDE 500 MG/1
TABLET, EXTENDED RELEASE ORAL
Qty: 180 TABLET | Refills: 1 | Status: SHIPPED | OUTPATIENT
Start: 2023-12-08

## 2023-12-08 RX ORDER — RAMIPRIL 10 MG/1
CAPSULE ORAL
Qty: 180 CAPSULE | Refills: 3 | Status: SHIPPED | OUTPATIENT
Start: 2023-12-08

## 2023-12-08 RX ORDER — SITAGLIPTIN 100 MG/1
100 TABLET, FILM COATED ORAL DAILY
Qty: 90 TABLET | Refills: 1 | Status: SHIPPED | OUTPATIENT
Start: 2023-12-08

## 2023-12-08 RX ORDER — ATORVASTATIN CALCIUM 40 MG/1
40 TABLET, FILM COATED ORAL DAILY
Qty: 90 TABLET | Refills: 3 | Status: SHIPPED | OUTPATIENT
Start: 2023-12-08

## 2023-12-08 RX ORDER — METOPROLOL SUCCINATE 50 MG/1
50 TABLET, EXTENDED RELEASE ORAL DAILY
Qty: 90 TABLET | Refills: 3 | Status: SHIPPED | OUTPATIENT
Start: 2023-12-08

## 2023-12-08 NOTE — PATIENT INSTRUCTIONS
Be mindful of diet. Stay active and stay hydrated. Check your blood sugars regularly. Continue your current diabetic medication regimen with Metformin and Januvia. Continue metoprolol and ramipril 20 mg daily. Discuss hypertension treatment with Dr. Nadiya Villanueva at upcoming appointment. Please check your blood pressure at least twice daily. Continue atorvastatin and Omega 3 Krill oil. Continue supplement with vitamin D3 daily.      Obtain a diabetic eye exam.

## 2023-12-08 NOTE — PROGRESS NOTES
Ricco Simeon 66 y.o. male MRN: [id number]    Encounter: [id number]      Assessment/Plan     Assessment: This is a 66y.o.-year-old male with type 2 diabetes with hypertension and hyperlipidemia. Plan:  1. Type 2 diabetes:  His hemoglobin A1c is elevated to 7.7. Has not been following a proper diabetic diet and has been consuming large amount of fruit while visiting with family over the past month. After some discussion, he will continue metformin 1000 mg twice daily and Januvia 100 mg daily. He will continue to check his blood sugars regularly and be mindful of his diet. Check hemoglobin A1c prior to next visit. 2.  Hyperlipidemia: LDL is more elevated than last.  Continue Lipitor. Check fasting lipid panel prior to next office visit. 3.  Hypertension: His blood pressure continues to be elevated despite an increase in his ramipril at last office visit. He does have the ability to take his blood pressure at home. I asked him to check his blood pressure in the morning and before bed and send a record to the office for review. Continue metoprolol. I have also asked him to follow-up with his primary care provider at his follow-up appointment next week for further evaluation of his hypertension. Check comprehensive metabolic panel prior to next visit. 4.  Vitamin-D deficiency:  Continue supplement with vitamin D3 daily. CC: Type 2 Diabetes follow up    History of Present Illness     HPI:  66y.o. year old male with type 2 diabetes for over 10 years. He is on oral agents at home and takes Metformin 1000 twice daily, Glimepiride 2 mg daily and Januvia 100 daily. He denies any polyuria, polydipsia, nocturia and blurry vision. He denies neuropathy, nephropathy and retinopathy. His most recent hemoglobin A1c from December 4, 2023 is 7.7. He states he was eating fruit in large part over the past month while traveling to see family in [place].      Hypoglycemic episodes: No.      The patient's last eye exam was on June 14, 2021. He has no complaints about his feet and does follow Podiatry for regular diabetic foot care with [facility]. Most recent diabetic foot exam was performed in our office on December 8, 2022. Blood Sugar/Glucometer/Pump/CGM review:  No blood sugar readings available for review. For his hypertension, he is treated with ramipril 20 mg daily and metoprolol 50 mg daily. His hyperlipidemia is treated with atorvastatin 40 mg daily and Omega 3 Krill oil  Daily. For his vitamin-D deficiency, he supplements with vitamin D3 daily. Review of Systems   Constitutional: Negative. Negative for chills, fatigue and fever. HENT: Negative. Negative for trouble swallowing and voice change. Eyes:  Negative for photophobia, pain, discharge, redness, itching and visual disturbance. Respiratory:  Negative for cough and shortness of breath. Cardiovascular:  Negative for chest pain and palpitations. Gastrointestinal:  Negative for abdominal pain, constipation, diarrhea, nausea and vomiting. Endocrine: Negative for cold intolerance, heat intolerance, polydipsia, polyphagia and polyuria. Genitourinary: Negative. Musculoskeletal:  Positive for arthralgias and back pain (chronic). Skin: Negative. Allergic/Immunologic: Negative. Neurological:  Negative for dizziness, syncope, light-headedness and headaches. Hematological: Negative. Psychiatric/Behavioral: Negative. All other systems reviewed and are negative. Historical Information   No past medical history on file. Past Surgical History:   Procedure Laterality Date    FINGER CONTRACTURE RELEASE       Social History   Social History     Substance and Sexual Activity   Alcohol Use Yes    Comment: Maybe 1 beer a week.      Social History     Substance and Sexual Activity   Drug Use Never     Social History     Tobacco Use   Smoking Status Former    Packs/day: 1.00    Types: Cigarettes    Quit date: 1    Years since quittin.9    Passive exposure: Past   Smokeless Tobacco Never     Family History:   Family History   Problem Relation Age of Onset    Breast cancer Mother     Hypertension Mother     Diabetes unspecified Mother     Heart disease Father        Meds/Allergies   Current Outpatient Medications   Medication Sig Dispense Refill    Ascorbic Acid (VITAMIN C) 1000 MG tablet Take 1,000 mg by mouth daily      atorvastatin (LIPITOR) 40 mg tablet Take 1 tablet (40 mg total) by mouth daily 1 tab daily 90 tablet 3    Blood Glucose Monitoring Suppl (ACCU-CHEK DEBORAH PLUS) w/Device KIT Check sugars once a day 1 kit 0    Blood Glucose Monitoring Suppl (ACCU-CHEK DEBORAH PLUS) w/Device KIT Check blood sugars once daily 1 kit 0    Cholecalciferol (VITAMIN D3) 400 units CAPS Take by mouth      Coenzyme Q10 (COQ10 PO) Take by mouth      FOLIC ACID PO Take by mouth      glimepiride (AMARYL) 2 mg tablet TAKE 1 TABLET BY MOUTH EVERY DAY 90 tablet 3    glucose blood (Accu-Chek Deborah Plus) test strip TEST ONCE A  each 6    Januvia 100 MG tablet Take 1 tablet (100 mg total) by mouth daily 1 tab daily 90 tablet 1    Lactobacillus (PROBIOTIC ACIDOPHILUS PO) Take by mouth      Magnesium 500 MG CAPS Take 500 mg by mouth daily      metFORMIN (GLUCOPHAGE-XR) 500 mg 24 hr tablet TAKE 1 TABLET BY MOUTH TWICE A  tablet 1    metoprolol succinate (TOPROL-XL) 50 mg 24 hr tablet Take 1 tablet (50 mg total) by mouth daily 90 tablet 3    Multiple Vitamin (MULTIVITAMIN ADULT PO) Take 1 capsule by mouth daily      OMEGA-3 KRILL OIL PO Take by mouth      ramipril (ALTACE) 10 MG capsule 1 tab bid 180 capsule 3    Turmeric 500 MG CAPS Take by mouth      Zinc Sulfate (ZINC 15 PO) Take by mouth       No current facility-administered medications for this visit. No Known Allergies    Objective   Vitals: There were no vitals taken for this visit. Physical Exam  Vitals reviewed.    Constitutional: Appearance: He is well-developed. HENT:      Head: Normocephalic and atraumatic. Nose: Nose normal.   Eyes:      Conjunctiva/sclera: Conjunctivae normal.      Pupils: Pupils are equal, round, and reactive to light. Cardiovascular:      Rate and Rhythm: Normal rate and regular rhythm. Pulses: no weak pulses          Dorsalis pedis pulses are 1+ on the right side and 1+ on the left side. Posterior tibial pulses are 1+ on the right side and 1+ on the left side. Heart sounds: Normal heart sounds. Pulmonary:      Effort: Pulmonary effort is normal.      Breath sounds: Normal breath sounds. Abdominal:      General: Bowel sounds are normal.      Palpations: Abdomen is soft. Musculoskeletal:         General: Normal range of motion. Cervical back: Normal range of motion and neck supple. Feet:      Right foot:      Skin integrity: No ulcer, skin breakdown, erythema, warmth, callus or dry skin. Left foot:      Skin integrity: No ulcer, skin breakdown, erythema, warmth, callus or dry skin. Skin:     General: Skin is warm and dry. Neurological:      Mental Status: He is alert and oriented to person, place, and time. Psychiatric:         Behavior: Behavior normal.         Thought Content: Thought content normal.         Judgment: Judgment normal.     Patient's shoes and socks removed. Right Foot/Ankle   Right Foot Inspection  Skin Exam: skin normal and skin intact. No dry skin, no warmth, no callus, no erythema, no maceration, no abnormal color, no pre-ulcer, no ulcer and no callus. Toe Exam: ROM and strength within normal limits. Sensory   Monofilament testing: intact    Vascular  Capillary refills: < 3 seconds  The right DP pulse is 1+. The right PT pulse is 1+. Left Foot/Ankle  Left Foot Inspection  Skin Exam: skin normal and skin intact. No dry skin, no warmth, no erythema, no maceration, normal color, no pre-ulcer, no ulcer and no callus.      Toe Exam: ROM and strength within normal limits. Sensory   Monofilament testing: intact    Vascular  Capillary refills: < 3 seconds  The left DP pulse is 1+. The left PT pulse is 1+. Assign Risk Category  No deformity present  No loss of protective sensation  No weak pulses    Lab Results:   Lab Results   Component Value Date/Time    Hemoglobin A1C 7.7 (H) 12/04/2023 09:29 AM    Hemoglobin A1C 7.1 (H) 05/31/2023 10:25 AM    White Blood Cell Count 5.5 12/04/2023 09:29 AM    White Blood Cell Count 5.7 06/10/2023 09:23 AM    Hemoglobin 12.5 (L) 12/04/2023 09:29 AM    Hemoglobin 12.6 (L) 06/10/2023 09:23 AM    HCT 37.5 (L) 12/04/2023 09:29 AM    HCT 37.0 (L) 06/10/2023 09:23 AM    MCV 91.9 12/04/2023 09:29 AM    MCV 92.5 06/10/2023 09:23 AM    Platelet Count 514 38/06/6174 09:29 AM    Platelet Count 428 57/16/8772 09:23 AM    BUN 20 12/04/2023 09:29 AM    BUN 17 06/10/2023 09:23 AM    Potassium 4.8 12/04/2023 09:29 AM    Potassium 4.4 06/10/2023 09:23 AM    Chloride 103 12/04/2023 09:29 AM    Chloride 104 06/10/2023 09:23 AM    CO2 24 12/04/2023 09:29 AM    CO2 27 06/10/2023 09:23 AM    Creatinine 1.18 12/04/2023 09:29 AM    Creatinine 1.09 06/10/2023 09:23 AM    AST 17 12/04/2023 09:29 AM    AST 17 06/10/2023 09:23 AM    ALT 13 12/04/2023 09:29 AM    ALT 18 06/10/2023 09:23 AM    Protein, Total 7.0 12/04/2023 09:29 AM    Protein, Total 6.7 06/10/2023 09:23 AM    Albumin 4.6 12/04/2023 09:29 AM    Albumin 4.4 06/10/2023 09:23 AM    Globulin 2.4 12/04/2023 09:29 AM    Globulin 2.3 06/10/2023 09:23 AM    HDL 42 12/04/2023 09:29 AM    HDL 40 06/10/2023 09:23 AM    Triglycerides 174 (H) 12/04/2023 09:29 AM    Triglycerides 172 (H) 06/10/2023 09:23 AM       Portions of the record may have been created with voice recognition software. Occasional wrong word or "sound a like" substitutions may have occurred due to the inherent limitations of voice recognition software.  Read the chart carefully and recognize, using context, where substitutions have occurred.

## 2024-05-07 ENCOUNTER — HOSPITAL ENCOUNTER (OUTPATIENT)
Dept: HOSPITAL 99 - HWRCS | Age: 79
End: 2024-05-07
Payer: MEDICARE

## 2024-05-07 DIAGNOSIS — I35.8: ICD-10-CM

## 2024-05-07 DIAGNOSIS — I51.7: Primary | ICD-10-CM

## 2024-06-07 LAB
ALBUMIN SERPL-MCNC: 4.6 G/DL (ref 3.6–5.1)
ALBUMIN/CREAT UR: 75 MG/G CREAT
ALBUMIN/GLOB SERPL: 1.7 (CALC) (ref 1–2.5)
ALP SERPL-CCNC: 84 U/L (ref 35–144)
ALT SERPL-CCNC: 17 U/L (ref 9–46)
AST SERPL-CCNC: 19 U/L (ref 10–35)
BASOPHILS # BLD AUTO: 41 CELLS/UL (ref 0–200)
BASOPHILS NFR BLD AUTO: 0.7 %
BILIRUB SERPL-MCNC: 0.5 MG/DL (ref 0.2–1.2)
BUN SERPL-MCNC: 27 MG/DL (ref 7–25)
BUN/CREAT SERPL: 20 (CALC) (ref 6–22)
CALCIUM SERPL-MCNC: 9.7 MG/DL (ref 8.6–10.3)
CHLORIDE SERPL-SCNC: 101 MMOL/L (ref 98–110)
CHOLEST SERPL-MCNC: 156 MG/DL
CHOLEST/HDLC SERPL: 3.6 (CALC)
CO2 SERPL-SCNC: 25 MMOL/L (ref 20–32)
CREAT SERPL-MCNC: 1.38 MG/DL (ref 0.7–1.28)
CREAT UR-MCNC: 56 MG/DL (ref 20–320)
EOSINOPHIL # BLD AUTO: 325 CELLS/UL (ref 15–500)
EOSINOPHIL NFR BLD AUTO: 5.5 %
ERYTHROCYTE [DISTWIDTH] IN BLOOD BY AUTOMATED COUNT: 12.6 % (ref 11–15)
EST. AVERAGE GLUCOSE BLD GHB EST-MCNC: 180 MG/DL
EST. AVERAGE GLUCOSE BLD GHB EST-SCNC: 10 MMOL/L
GFR/BSA.PRED SERPLBLD CYS-BASED-ARV: 52 ML/MIN/1.73M2
GLOBULIN SER CALC-MCNC: 2.7 G/DL (CALC) (ref 1.9–3.7)
GLUCOSE SERPL-MCNC: 136 MG/DL (ref 65–99)
HBA1C MFR BLD: 7.9 % OF TOTAL HGB
HCT VFR BLD AUTO: 36 % (ref 38.5–50)
HDLC SERPL-MCNC: 43 MG/DL
HGB BLD-MCNC: 12 G/DL (ref 13.2–17.1)
LDLC SERPL CALC-MCNC: 88 MG/DL (CALC)
LYMPHOCYTES # BLD AUTO: 1156 CELLS/UL (ref 850–3900)
LYMPHOCYTES NFR BLD AUTO: 19.6 %
MCH RBC QN AUTO: 31.5 PG (ref 27–33)
MCHC RBC AUTO-ENTMCNC: 33.3 G/DL (ref 32–36)
MCV RBC AUTO: 94.5 FL (ref 80–100)
MICROALBUMIN UR-MCNC: 4.2 MG/DL
MONOCYTES # BLD AUTO: 643 CELLS/UL (ref 200–950)
MONOCYTES NFR BLD AUTO: 10.9 %
NEUTROPHILS # BLD AUTO: 3735 CELLS/UL (ref 1500–7800)
NEUTROPHILS NFR BLD AUTO: 63.3 %
NONHDLC SERPL-MCNC: 113 MG/DL (CALC)
PLATELET # BLD AUTO: 242 THOUSAND/UL (ref 140–400)
PMV BLD REES-ECKER: 10.3 FL (ref 7.5–12.5)
POTASSIUM SERPL-SCNC: 4.6 MMOL/L (ref 3.5–5.3)
PROT SERPL-MCNC: 7.3 G/DL (ref 6.1–8.1)
RBC # BLD AUTO: 3.81 MILLION/UL (ref 4.2–5.8)
SODIUM SERPL-SCNC: 136 MMOL/L (ref 135–146)
T4 FREE SERPL-MCNC: 1 NG/DL (ref 0.8–1.8)
TRIGL SERPL-MCNC: 145 MG/DL
TSH SERPL-ACNC: 5.34 MIU/L (ref 0.4–4.5)
WBC # BLD AUTO: 5.9 THOUSAND/UL (ref 3.8–10.8)

## 2024-06-12 ENCOUNTER — OFFICE VISIT (OUTPATIENT)
Dept: ENDOCRINOLOGY | Facility: HOSPITAL | Age: 79
End: 2024-06-12
Payer: MEDICARE

## 2024-06-12 VITALS
DIASTOLIC BLOOD PRESSURE: 80 MMHG | HEART RATE: 50 BPM | BODY MASS INDEX: 26.43 KG/M2 | HEIGHT: 70 IN | WEIGHT: 184.6 LBS | SYSTOLIC BLOOD PRESSURE: 134 MMHG

## 2024-06-12 DIAGNOSIS — I10 ESSENTIAL HYPERTENSION: ICD-10-CM

## 2024-06-12 DIAGNOSIS — E78.5 HYPERLIPIDEMIA, UNSPECIFIED HYPERLIPIDEMIA TYPE: ICD-10-CM

## 2024-06-12 DIAGNOSIS — E11.8 TYPE 2 DIABETES MELLITUS WITH COMPLICATION, WITHOUT LONG-TERM CURRENT USE OF INSULIN (HCC): ICD-10-CM

## 2024-06-12 DIAGNOSIS — E11.9 TYPE 2 DIABETES MELLITUS WITHOUT COMPLICATION, WITHOUT LONG-TERM CURRENT USE OF INSULIN (HCC): Primary | ICD-10-CM

## 2024-06-12 PROCEDURE — 99214 OFFICE O/P EST MOD 30 MIN: CPT | Performed by: NURSE PRACTITIONER

## 2024-06-12 RX ORDER — VALSARTAN AND HYDROCHLOROTHIAZIDE 160; 25 MG/1; MG/1
1 TABLET ORAL DAILY
COMMUNITY
Start: 2024-04-10

## 2024-06-12 RX ORDER — METFORMIN HYDROCHLORIDE 500 MG/1
TABLET, EXTENDED RELEASE ORAL
Qty: 180 TABLET | Refills: 1 | Status: SHIPPED | OUTPATIENT
Start: 2024-06-12

## 2024-06-12 RX ORDER — FLUOXETINE HYDROCHLORIDE 20 MG/1
20 CAPSULE ORAL DAILY
COMMUNITY
Start: 2024-05-17

## 2024-06-12 RX ORDER — AMLODIPINE BESYLATE 5 MG/1
5 TABLET ORAL 2 TIMES DAILY
COMMUNITY
Start: 2024-05-17

## 2024-06-12 NOTE — PATIENT INSTRUCTIONS
Be mindful of diet.     Stay active and stay hydrated with water.     Check your blood sugars regularly twice daily at alternating times as discussed.     Continue your current diabetic medication regimen with Metformin, Glimiperide and Januvia.     Continue metoprolol and ramipril 20 mg daily.     Continue atorvastatin and Omega 3 Krill oil.     Continue supplement with vitamin D3 daily.     Obtain a diabetic eye exam.

## 2024-06-12 NOTE — PROGRESS NOTES
Javi Cardona 79 y.o. male MRN: [id number]    Encounter: [id number]      Assessment & Plan     Assessment:  This is a 79 y.o.-year-old male with  type 2 diabetes with hypertension and hyperlipidemia.     Plan:  1.  Type 2 diabetes:  His hemoglobin A1c is elevated to 7.9.  Has not been following a proper diabetic diet and has been consuming large amount of fruit while visiting with family over the past month.  After some discussion, he will continue metformin 1000 mg twice daily, glimepiride 2 mg daily and Januvia 100 mg daily.  He will continue to check his blood sugars twice daily at alternating times and send a record to the office in 2 weeks for review at which time we may increase his glimepiride dose.  Urged him to be mindful of his diet.  Check hemoglobin A1c prior to next visit.     2.  Hyperlipidemia: LDL is improved.  Continue Lipitor. Check fasting lipid panel prior to next office visit.     3.  Hypertension: Reasonable in the office today.  Continue current regimen. Check comprehensive metabolic panel prior to next visit.     4.  Vitamin-D deficiency:  Continue supplement with vitamin D3 daily.       CC: Type 2 Diabetes follow up     History of Present Illness     HPI:  79 y.o. year old male with type 2 diabetes for over 10 years.  He is on oral agents at home and takes Metformin 1000 twice daily, Glimepiride 2 mg daily at dinner and Januvia 100 daily. He denies any polyuria, polydipsia, nocturia and blurry vision.  He denies neuropathy, nephropathy and retinopathy.   His most recent hemoglobin A1c from December 4, 2023 is 7.9.  He states he was eating fruit in large part over the past month while traveling to see family in [place].     Hypoglycemic episodes: No.      The patient's last eye exam was on July 17, 2023.  He has no complaints about his feet and does follow Podiatry for regular diabetic foot care with [facility].  Most recent diabetic foot exam was performed recently by   [facility], by his report.     Blood Sugar/Glucometer/Pump/CGM review:  No blood sugar readings available for review.     For his hypertension, he is treated with ramipril 20 mg daily and metoprolol 50 mg daily.     His hyperlipidemia is treated with atorvastatin 40 mg daily and Omega 3 Krill oil  Daily.     For his vitamin-D deficiency, he supplements with vitamin D3 daily.     Review of Systems   Constitutional: Negative.  Negative for chills, fatigue and fever.   HENT: Negative.  Negative for trouble swallowing and voice change.    Eyes:  Negative for photophobia, pain, discharge, redness, itching and visual disturbance.   Respiratory:  Negative for cough and shortness of breath.    Cardiovascular:  Negative for chest pain and palpitations.   Gastrointestinal:  Negative for abdominal pain, constipation, diarrhea, nausea and vomiting.   Endocrine: Negative for cold intolerance, heat intolerance, polydipsia, polyphagia and polyuria.   Genitourinary: Negative.    Musculoskeletal:  Positive for arthralgias and back pain (chronic).   Skin: Negative.    Allergic/Immunologic: Negative.    Neurological:  Negative for dizziness, syncope, light-headedness and headaches.   Hematological: Negative.    Psychiatric/Behavioral: Negative.     All other systems reviewed and are negative.      Historical Information   No past medical history on file.  Past Surgical History:   Procedure Laterality Date    FINGER CONTRACTURE RELEASE       Social History   Social History     Substance and Sexual Activity   Alcohol Use Yes    Comment: Maybe 1 beer a week.     Social History     Substance and Sexual Activity   Drug Use Never     Social History     Tobacco Use   Smoking Status Former    Current packs/day: 0.00    Types: Cigarettes    Quit date:     Years since quittin.4    Passive exposure: Past   Smokeless Tobacco Never     Family History:   Family History   Problem Relation Age of Onset    Breast cancer Mother     Hypertension  "Mother     Diabetes unspecified Mother     Heart disease Father        Meds/Allergies   Current Outpatient Medications   Medication Sig Dispense Refill    amLODIPine (NORVASC) 5 mg tablet Take 5 mg by mouth 2 (two) times a day      Ascorbic Acid (VITAMIN C) 1000 MG tablet Take 1,000 mg by mouth daily      atorvastatin (LIPITOR) 40 mg tablet Take 1 tablet (40 mg total) by mouth daily 1 tab daily 90 tablet 3    Blood Glucose Monitoring Suppl (ACCU-CHEK DEBORAH PLUS) w/Device KIT Check sugars once a day 1 kit 0    Blood Glucose Monitoring Suppl (ACCU-CHEK DEBORAH PLUS) w/Device KIT Check blood sugars once daily 1 kit 0    Cholecalciferol (VITAMIN D3) 400 units CAPS Take by mouth      Coenzyme Q10 (COQ10 PO) Take by mouth      FLUoxetine (PROzac) 20 mg capsule Take 20 mg by mouth daily      FOLIC ACID PO Take by mouth      glimepiride (AMARYL) 2 mg tablet TAKE 1 TABLET BY MOUTH EVERY DAY 90 tablet 3    glucose blood (Accu-Chek Deborah Plus) test strip TEST ONCE A  each 6    Januvia 100 MG tablet Take 1 tablet (100 mg total) by mouth daily 1 tab daily 90 tablet 1    Lactobacillus (PROBIOTIC ACIDOPHILUS PO) Take by mouth      Magnesium 500 MG CAPS Take 500 mg by mouth daily      metFORMIN (GLUCOPHAGE-XR) 500 mg 24 hr tablet TAKE 1 TABLET BY MOUTH TWICE A  tablet 1    metoprolol succinate (TOPROL-XL) 50 mg 24 hr tablet Take 1 tablet (50 mg total) by mouth daily 90 tablet 3    Multiple Vitamin (MULTIVITAMIN ADULT PO) Take 1 capsule by mouth daily      OMEGA-3 KRILL OIL PO Take by mouth      ramipril (ALTACE) 10 MG capsule 1 tab bid 180 capsule 3    Turmeric 500 MG CAPS Take by mouth      valsartan-hydrochlorothiazide (DIOVAN-HCT) 160-25 MG per tablet Take 1 tablet by mouth daily      Zinc Sulfate (ZINC 15 PO) Take by mouth       No current facility-administered medications for this visit.     No Known Allergies    Objective   Vitals: Blood pressure 134/80, pulse (!) 50, height 5' 10\" (1.778 m), weight 83.7 kg (184 " lb 9.6 oz).    Physical Exam  Vitals reviewed.   Constitutional:       Appearance: He is well-developed.   HENT:      Head: Normocephalic and atraumatic.      Nose: Nose normal.   Eyes:      Conjunctiva/sclera: Conjunctivae normal.      Pupils: Pupils are equal, round, and reactive to light.   Cardiovascular:      Rate and Rhythm: Normal rate and regular rhythm.      Heart sounds: Normal heart sounds.   Pulmonary:      Effort: Pulmonary effort is normal.      Breath sounds: Normal breath sounds.   Abdominal:      General: Bowel sounds are normal.      Palpations: Abdomen is soft.   Musculoskeletal:         General: Normal range of motion.      Cervical back: Normal range of motion and neck supple.   Skin:     General: Skin is warm and dry.   Neurological:      Mental Status: He is alert and oriented to person, place, and time.   Psychiatric:         Behavior: Behavior normal.         Thought Content: Thought content normal.         Judgment: Judgment normal.         Lab Results:   Lab Results   Component Value Date/Time    Hemoglobin A1C 7.9 (H) 06/07/2024 09:00 AM    Hemoglobin A1C 7.7 (H) 12/04/2023 09:29 AM    White Blood Cell Count 5.9 06/07/2024 09:00 AM    White Blood Cell Count 5.5 12/04/2023 09:29 AM    Hemoglobin 12.0 (L) 06/07/2024 09:00 AM    Hemoglobin 12.5 (L) 12/04/2023 09:29 AM    HCT 36.0 (L) 06/07/2024 09:00 AM    HCT 37.5 (L) 12/04/2023 09:29 AM    MCV 94.5 06/07/2024 09:00 AM    MCV 91.9 12/04/2023 09:29 AM    Platelet Count 242 06/07/2024 09:00 AM    Platelet Count 204 12/04/2023 09:29 AM    BUN 27 (H) 06/07/2024 09:00 AM    BUN 20 12/04/2023 09:29 AM    Potassium 4.6 06/07/2024 09:00 AM    Potassium 4.8 12/04/2023 09:29 AM    Chloride 101 06/07/2024 09:00 AM    Chloride 103 12/04/2023 09:29 AM    CO2 25 06/07/2024 09:00 AM    CO2 24 12/04/2023 09:29 AM    Creatinine 1.38 (H) 06/07/2024 09:00 AM    Creatinine 1.18 12/04/2023 09:29 AM    AST 19 06/07/2024 09:00 AM    AST 17 12/04/2023 09:29 AM     "ALT 17 06/07/2024 09:00 AM    ALT 13 12/04/2023 09:29 AM    Protein, Total 7.3 06/07/2024 09:00 AM    Protein, Total 7.0 12/04/2023 09:29 AM    Albumin 4.6 06/07/2024 09:00 AM    Albumin 4.6 12/04/2023 09:29 AM    Globulin 2.7 06/07/2024 09:00 AM    Globulin 2.4 12/04/2023 09:29 AM    HDL 43 06/07/2024 09:00 AM    HDL 42 12/04/2023 09:29 AM    Triglycerides 145 06/07/2024 09:00 AM    Triglycerides 174 (H) 12/04/2023 09:29 AM       Portions of the record may have been created with voice recognition software. Occasional wrong word or \"sound a like\" substitutions may have occurred due to the inherent limitations of voice recognition software. Read the chart carefully and recognize, using context, where substitutions have occurred.    "

## 2024-06-13 ENCOUNTER — TELEPHONE (OUTPATIENT)
Dept: ADMINISTRATIVE | Facility: OTHER | Age: 79
End: 2024-06-13

## 2024-06-13 NOTE — TELEPHONE ENCOUNTER
Upon review of the In Basket request and the patient's chart, initial outreach has been made via fax to facility. Please see Contacts section for details.     Thank you  Caitlin Barroso

## 2024-06-13 NOTE — LETTER
Diabetic Foot Exam Form    Date Requested: 24  Patient: Javi Cardona  Patient : 1945   Referring Provider: Jennifer Hayes MD    Diabetic Foot Exam Performed with shoes and socks removed        Yes         No     Date of Diabetic Foot Exam ______________________________  Risk Score ____________________________________________    Left Foot       Visual Inspection         Monofilament Testing Sensory Exam        Pedal Pulses         Additional Comments         Right Foot      Visual Inspection         Monofilament Testing Sensory Exam       Pedal Pulses         Additional Comments         Comments __________________________________________________________    Practice Providing Exam ______________________________________________    Exam Performed By (print name) _______________________________________      Provider Signature ___________________________________________________      These reports are needed for  compliance.    Please fax this completed form and a copy of the Diabetic Foot Exam report to our office located at [address] [place] [address] as soon as possible via Fax [phone] khanh Tucker: Phone [phone]    We thank you for your assistance in treating our mutual patient.

## 2024-06-13 NOTE — TELEPHONE ENCOUNTER
----- Message from Jyotsna BAGLEY sent at 6/12/2024 12:11 PM EDT -----  Regarding: DM FOOT EXAM  06/12/24 12:11 PM    Hello, our patient Javi Cardona has had a DM Foot Exam performed by Dr Lolita Chapa. Her number is [phone].    Thank you,  Jyotsna Betancourt  [facility] [place]

## 2024-06-18 NOTE — TELEPHONE ENCOUNTER
Upon review of the In Basket request we have found as a result of outreach that patient did not have the requested item(s) completed. Per office, patient was last seen in 2021.    Any additional questions or concerns should be emailed to the Practice Liaisons via the appropriate education email address, please do not reply via In Basket.    Thank you  Caitlin Barroso   [facility]

## 2024-07-01 DIAGNOSIS — E11.8 TYPE 2 DIABETES MELLITUS WITH COMPLICATION, WITHOUT LONG-TERM CURRENT USE OF INSULIN (HCC): ICD-10-CM

## 2024-07-01 RX ORDER — BLOOD SUGAR DIAGNOSTIC
STRIP MISCELLANEOUS
Qty: 100 EACH | Refills: 1 | Status: SHIPPED | OUTPATIENT
Start: 2024-07-01

## 2024-07-01 NOTE — TELEPHONE ENCOUNTER
Reason for call:   [x] Refill   [] Prior Auth  [] Other:     Office:   [] PCP/Provider -   [x] Specialty/Provider - ENDO    Medication: TEST STRIPS - ACCU CHECK DEBORAH PLUS    Dose/Frequency:     Quantity: 100    Pharmacy:   [id number] [place] - [address]  [address] [address]  Phone: [phone]  Fax: [phone]  ELIZABETH #: [id number]     Does the patient have enough for 3 days?   [x] Yes   [] No - Send as HP to POD

## 2024-08-01 ENCOUNTER — TELEPHONE (OUTPATIENT)
Dept: ENDOCRINOLOGY | Facility: HOSPITAL | Age: 79
End: 2024-08-01

## 2024-12-07 LAB
ALBUMIN SERPL-MCNC: 4.4 G/DL (ref 3.6–5.1)
ALBUMIN/GLOB SERPL: 1.9 (CALC) (ref 1–2.5)
ALP SERPL-CCNC: 77 U/L (ref 35–144)
ALT SERPL-CCNC: 16 U/L (ref 9–46)
AST SERPL-CCNC: 19 U/L (ref 10–35)
BASOPHILS # BLD AUTO: 41 CELLS/UL (ref 0–200)
BASOPHILS NFR BLD AUTO: 0.7 %
BILIRUB SERPL-MCNC: 0.8 MG/DL (ref 0.2–1.2)
BUN SERPL-MCNC: 25 MG/DL (ref 7–25)
BUN/CREAT SERPL: 19 (CALC) (ref 6–22)
CALCIUM SERPL-MCNC: 9.5 MG/DL (ref 8.6–10.3)
CHLORIDE SERPL-SCNC: 104 MMOL/L (ref 98–110)
CHOLEST SERPL-MCNC: 150 MG/DL
CHOLEST/HDLC SERPL: 3.8 (CALC)
CO2 SERPL-SCNC: 25 MMOL/L (ref 20–32)
CREAT SERPL-MCNC: 1.34 MG/DL (ref 0.7–1.28)
EOSINOPHIL # BLD AUTO: 336 CELLS/UL (ref 15–500)
EOSINOPHIL NFR BLD AUTO: 5.8 %
ERYTHROCYTE [DISTWIDTH] IN BLOOD BY AUTOMATED COUNT: 13.1 % (ref 11–15)
GFR/BSA.PRED SERPLBLD CYS-BASED-ARV: 54 ML/MIN/1.73M2
GLOBULIN SER CALC-MCNC: 2.3 G/DL (CALC) (ref 1.9–3.7)
GLUCOSE SERPL-MCNC: 202 MG/DL (ref 65–99)
HBA1C MFR BLD: 7.2 % OF TOTAL HGB
HCT VFR BLD AUTO: 36.3 % (ref 38.5–50)
HDLC SERPL-MCNC: 40 MG/DL
HGB BLD-MCNC: 11.8 G/DL (ref 13.2–17.1)
LDLC SERPL CALC-MCNC: 85 MG/DL (CALC)
LYMPHOCYTES # BLD AUTO: 1253 CELLS/UL (ref 850–3900)
LYMPHOCYTES NFR BLD AUTO: 21.6 %
MCH RBC QN AUTO: 30.6 PG (ref 27–33)
MCHC RBC AUTO-ENTMCNC: 32.5 G/DL (ref 32–36)
MCV RBC AUTO: 94.3 FL (ref 80–100)
MONOCYTES # BLD AUTO: 696 CELLS/UL (ref 200–950)
MONOCYTES NFR BLD AUTO: 12 %
NEUTROPHILS # BLD AUTO: 3474 CELLS/UL (ref 1500–7800)
NEUTROPHILS NFR BLD AUTO: 59.9 %
NONHDLC SERPL-MCNC: 110 MG/DL (CALC)
PLATELET # BLD AUTO: 221 THOUSAND/UL (ref 140–400)
PMV BLD REES-ECKER: 10.4 FL (ref 7.5–12.5)
POTASSIUM SERPL-SCNC: 4.5 MMOL/L (ref 3.5–5.3)
PROT SERPL-MCNC: 6.7 G/DL (ref 6.1–8.1)
RBC # BLD AUTO: 3.85 MILLION/UL (ref 4.2–5.8)
SODIUM SERPL-SCNC: 137 MMOL/L (ref 135–146)
T4 FREE SERPL-MCNC: 1 NG/DL (ref 0.8–1.8)
TRIGL SERPL-MCNC: 145 MG/DL
TSH SERPL-ACNC: 6.08 MIU/L (ref 0.4–4.5)
WBC # BLD AUTO: 5.8 THOUSAND/UL (ref 3.8–10.8)

## 2024-12-10 ENCOUNTER — RESULTS FOLLOW-UP (OUTPATIENT)
Dept: ENDOCRINOLOGY | Facility: HOSPITAL | Age: 79
End: 2024-12-10

## 2024-12-12 ENCOUNTER — OFFICE VISIT (OUTPATIENT)
Dept: ENDOCRINOLOGY | Facility: HOSPITAL | Age: 79
End: 2024-12-12
Payer: MEDICARE

## 2024-12-12 ENCOUNTER — TELEPHONE (OUTPATIENT)
Age: 79
End: 2024-12-12

## 2024-12-12 ENCOUNTER — DOCUMENTATION (OUTPATIENT)
Dept: ENDOCRINOLOGY | Facility: HOSPITAL | Age: 79
End: 2024-12-12

## 2024-12-12 VITALS
DIASTOLIC BLOOD PRESSURE: 70 MMHG | OXYGEN SATURATION: 98 % | HEIGHT: 70 IN | WEIGHT: 189.8 LBS | HEART RATE: 65 BPM | SYSTOLIC BLOOD PRESSURE: 122 MMHG | BODY MASS INDEX: 27.17 KG/M2

## 2024-12-12 DIAGNOSIS — E11.9 TYPE 2 DIABETES MELLITUS WITHOUT COMPLICATION, WITHOUT LONG-TERM CURRENT USE OF INSULIN (HCC): ICD-10-CM

## 2024-12-12 DIAGNOSIS — E11.8 TYPE 2 DIABETES MELLITUS WITH COMPLICATION, WITHOUT LONG-TERM CURRENT USE OF INSULIN (HCC): Primary | ICD-10-CM

## 2024-12-12 DIAGNOSIS — I10 ESSENTIAL HYPERTENSION: ICD-10-CM

## 2024-12-12 DIAGNOSIS — E78.5 HYPERLIPIDEMIA, UNSPECIFIED HYPERLIPIDEMIA TYPE: ICD-10-CM

## 2024-12-12 DIAGNOSIS — I10 HYPERTENSION, UNSPECIFIED TYPE: ICD-10-CM

## 2024-12-12 PROCEDURE — 99214 OFFICE O/P EST MOD 30 MIN: CPT | Performed by: NURSE PRACTITIONER

## 2024-12-12 RX ORDER — VALSARTAN 160 MG/1
TABLET ORAL EVERY 12 HOURS
COMMUNITY
Start: 2024-10-22

## 2024-12-12 RX ORDER — SITAGLIPTIN 100 MG/1
TABLET ORAL
Start: 2024-12-12

## 2024-12-12 RX ORDER — METOPROLOL SUCCINATE 25 MG/1
TABLET, EXTENDED RELEASE ORAL
COMMUNITY
Start: 2024-11-20

## 2024-12-12 RX ORDER — AMLODIPINE BESYLATE 2.5 MG/1
1 TABLET ORAL DAILY
COMMUNITY
Start: 2024-09-29

## 2024-12-12 RX ORDER — MUPIROCIN 20 MG/G
OINTMENT TOPICAL
COMMUNITY
Start: 2024-10-02

## 2024-12-12 NOTE — TELEPHONE ENCOUNTER
Pt had appt today he did not get paper lab orders . He would like them mailed to his home.     Address confirmed. Please mail. Thanks.

## 2024-12-12 NOTE — PROGRESS NOTES
Javi Cardona 79 y.o. male MRN: [id number]    Encounter: [id number]      Assessment & Plan     Assessment:  This is a 79 y.o.-year-old male with type 2 diabetes with hypertension and hyperlipidemia.     Plan:  1.  Type 2 diabetes:  His hemoglobin A1c is improved to 7.2.  Has not been following a proper diabetic diet and has been consuming large amount of fruit while visiting with family over the past month.  After some discussion, he will continue metformin 1000 mg twice daily, glimepiride 2 mg daily and Januvia 100 mg daily.  He will continue to check his blood sugars twice daily at alternating times and send a record to the office in 2 weeks for review.  Check hemoglobin A1c prior to next visit.     2.  Hyperlipidemia: LDL is slightly more improved.  Continue Lipitor. Check fasting lipid panel prior to next office visit.     3.  Hypertension: He is normotensive in the office today.  Continue current regimen. Check comprehensive metabolic panel prior to next visit.     4.  Vitamin-D deficiency:  Continue supplement with vitamin D3 daily.    CC: Type 2 Diabetes follow up    History of Present Illness     HPI:  79 y.o. year old male with type 2 diabetes for over 10 years.  He is on oral agents at home and takes Metformin 1000 twice daily, Glimepiride 2 mg daily at dinner and Januvia 100 daily.  He will need to switch to Zituvio in 2025 due to insurance coverage. He denies any polyuria, polydipsia, nocturia and blurry vision.  He denies neuropathy, nephropathy and retinopathy.   His most recent hemoglobin A1c from December 6, 2024 is 7.2.       Hypoglycemic episodes: No.      The patient's last eye exam was on July 17, 2023.  He has no complaints about his feet and does follow Podiatry for regular diabetic foot care with [facility].  Most recent diabetic foot exam was performed by Dr. Chapa in May 2024, by his report.     Blood Sugar/Glucometer/Pump/CGM review:  No blood sugar readings available for  review.     For his hypertension, he is treated with ramipril 20 mg daily and metoprolol 50 mg daily.     His hyperlipidemia is treated with atorvastatin 40 mg daily and Omega 3 Krill oil  Daily.     For his vitamin-D deficiency, he supplements with vitamin D3 daily.     Review of Systems   Constitutional: Negative.  Negative for chills, fatigue and fever.   HENT: Negative.  Negative for trouble swallowing and voice change.    Eyes:  Negative for photophobia, pain, discharge, redness, itching and visual disturbance.   Respiratory:  Negative for cough and shortness of breath.    Cardiovascular:  Negative for chest pain and palpitations.   Gastrointestinal:  Negative for abdominal pain, constipation, diarrhea, nausea and vomiting.   Endocrine: Negative for cold intolerance, heat intolerance, polydipsia, polyphagia and polyuria.   Genitourinary: Negative.    Musculoskeletal:  Positive for arthralgias and back pain (chronic).   Skin: Negative.    Allergic/Immunologic: Negative.    Neurological:  Negative for dizziness, syncope, light-headedness and headaches.   Hematological: Negative.    Psychiatric/Behavioral: Negative.     All other systems reviewed and are negative.      Historical Information   No past medical history on file.  Past Surgical History:   Procedure Laterality Date    FINGER CONTRACTURE RELEASE       Social History   Social History     Substance and Sexual Activity   Alcohol Use Yes    Comment: Maybe 1 beer a week.     Social History     Substance and Sexual Activity   Drug Use Never     Social History     Tobacco Use   Smoking Status Former    Current packs/day: 0.00    Types: Cigarettes    Quit date:     Years since quittin.9    Passive exposure: Past   Smokeless Tobacco Never     Family History:   Family History   Problem Relation Age of Onset    Breast cancer Mother     Hypertension Mother     Diabetes unspecified Mother     Heart disease Father        Meds/Allergies   Current Outpatient  Medications   Medication Sig Dispense Refill    amLODIPine (NORVASC) 5 mg tablet Take 5 mg by mouth 2 (two) times a day      Ascorbic Acid (VITAMIN C) 1000 MG tablet Take 1,000 mg by mouth daily      atorvastatin (LIPITOR) 40 mg tablet Take 1 tablet (40 mg total) by mouth daily 1 tab daily 90 tablet 3    Blood Glucose Monitoring Suppl (ACCU-CHEK DEBORAH PLUS) w/Device KIT Check sugars once a day 1 kit 0    Blood Glucose Monitoring Suppl (ACCU-CHEK DEBORAH PLUS) w/Device KIT Check blood sugars once daily 1 kit 0    Cholecalciferol (VITAMIN D3) 400 units CAPS Take by mouth      Coenzyme Q10 (COQ10 PO) Take by mouth      FLUoxetine (PROzac) 20 mg capsule Take 20 mg by mouth daily      FOLIC ACID PO Take by mouth      glimepiride (AMARYL) 2 mg tablet TAKE 1 TABLET BY MOUTH EVERY DAY 90 tablet 3    glucose blood (Accu-Chek Deborah Plus) test strip TEST ONCE A  each 1    Januvia 100 MG tablet Take 1 tablet (100 mg total) by mouth daily 1 tab daily 90 tablet 1    Lactobacillus (PROBIOTIC ACIDOPHILUS PO) Take by mouth      Magnesium 500 MG CAPS Take 500 mg by mouth daily      metFORMIN (GLUCOPHAGE-XR) 500 mg 24 hr tablet TAKE 1 TABLET BY MOUTH TWICE A  tablet 1    metoprolol succinate (TOPROL-XL) 50 mg 24 hr tablet Take 1 tablet (50 mg total) by mouth daily 90 tablet 3    Multiple Vitamin (MULTIVITAMIN ADULT PO) Take 1 capsule by mouth daily      OMEGA-3 KRILL OIL PO Take by mouth      ramipril (ALTACE) 10 MG capsule 1 tab bid 180 capsule 3    Turmeric 500 MG CAPS Take by mouth      valsartan-hydrochlorothiazide (DIOVAN-HCT) 160-25 MG per tablet Take 1 tablet by mouth daily      Zinc Sulfate (ZINC 15 PO) Take by mouth       No current facility-administered medications for this visit.     No Known Allergies    Objective   Vitals: There were no vitals taken for this visit.    Physical Exam  Vitals reviewed.   Constitutional:       Appearance: He is well-developed.   HENT:      Head: Normocephalic and atraumatic.       Nose: Nose normal.   Eyes:      Conjunctiva/sclera: Conjunctivae normal.      Pupils: Pupils are equal, round, and reactive to light.   Cardiovascular:      Rate and Rhythm: Normal rate and regular rhythm.      Heart sounds: Normal heart sounds.   Pulmonary:      Effort: Pulmonary effort is normal.      Breath sounds: Normal breath sounds.   Abdominal:      General: Bowel sounds are normal.      Palpations: Abdomen is soft.   Musculoskeletal:         General: Normal range of motion.      Cervical back: Normal range of motion and neck supple.   Skin:     General: Skin is warm and dry.   Neurological:      Mental Status: He is alert and oriented to person, place, and time.   Psychiatric:         Behavior: Behavior normal.         Thought Content: Thought content normal.         Judgment: Judgment normal.         Lab Results:   Lab Results   Component Value Date/Time    Hemoglobin A1C 7.2 (H) 12/06/2024 08:50 AM    Hemoglobin A1C 7.9 (H) 06/07/2024 09:00 AM    White Blood Cell Count 5.8 12/06/2024 08:50 AM    White Blood Cell Count 5.9 06/07/2024 09:00 AM    Hemoglobin 11.8 (L) 12/06/2024 08:50 AM    Hemoglobin 12.0 (L) 06/07/2024 09:00 AM    HCT 36.3 (L) 12/06/2024 08:50 AM    HCT 36.0 (L) 06/07/2024 09:00 AM    MCV 94.3 12/06/2024 08:50 AM    MCV 94.5 06/07/2024 09:00 AM    Platelet Count 221 12/06/2024 08:50 AM    Platelet Count 242 06/07/2024 09:00 AM    BUN 25 12/06/2024 08:50 AM    BUN 27 (H) 06/07/2024 09:00 AM    Potassium 4.5 12/06/2024 08:50 AM    Potassium 4.6 06/07/2024 09:00 AM    Chloride 104 12/06/2024 08:50 AM    Chloride 101 06/07/2024 09:00 AM    CO2 25 12/06/2024 08:50 AM    CO2 25 06/07/2024 09:00 AM    Creatinine 1.34 (H) 12/06/2024 08:50 AM    Creatinine 1.38 (H) 06/07/2024 09:00 AM    AST 19 12/06/2024 08:50 AM    AST 19 06/07/2024 09:00 AM    ALT 16 12/06/2024 08:50 AM    ALT 17 06/07/2024 09:00 AM    Protein, Total 6.7 12/06/2024 08:50 AM    Protein, Total 7.3 06/07/2024 09:00 AM    Albumin 4.4  "12/06/2024 08:50 AM    Albumin 4.6 06/07/2024 09:00 AM    Globulin 2.3 12/06/2024 08:50 AM    Globulin 2.7 06/07/2024 09:00 AM    HDL 40 12/06/2024 08:50 AM    HDL 43 06/07/2024 09:00 AM    Triglycerides 145 12/06/2024 08:50 AM    Triglycerides 145 06/07/2024 09:00 AM       Portions of the record may have been created with voice recognition software. Occasional wrong word or \"sound a like\" substitutions may have occurred due to the inherent limitations of voice recognition software. Read the chart carefully and recognize, using context, where substitutions have occurred.    "

## 2024-12-12 NOTE — PROGRESS NOTES
The patient brought in paperwork concerning his Januvia.  They will no longer cover brand name Januvia they will cover either Saxagliptin or Zituvio starting January 1, 2025

## 2024-12-16 DIAGNOSIS — E11.9 TYPE 2 DIABETES MELLITUS WITHOUT COMPLICATION, WITHOUT LONG-TERM CURRENT USE OF INSULIN (HCC): ICD-10-CM

## 2024-12-17 ENCOUNTER — HOSPITAL ENCOUNTER (OUTPATIENT)
Dept: HOSPITAL 99 - RAD | Age: 79
End: 2024-12-17
Payer: MEDICARE

## 2024-12-17 DIAGNOSIS — I65.29: ICD-10-CM

## 2024-12-17 DIAGNOSIS — R09.89: Primary | ICD-10-CM

## 2024-12-17 RX ORDER — METFORMIN HYDROCHLORIDE 500 MG/1
500 TABLET, EXTENDED RELEASE ORAL 2 TIMES DAILY
Qty: 180 TABLET | Refills: 1 | Status: SHIPPED | OUTPATIENT
Start: 2024-12-17

## 2024-12-26 ENCOUNTER — HOSPITAL ENCOUNTER (OUTPATIENT)
Dept: HOSPITAL 99 - RAD | Age: 79
End: 2024-12-26
Payer: MEDICARE

## 2024-12-26 DIAGNOSIS — I70.1: Primary | ICD-10-CM

## 2024-12-27 ENCOUNTER — TELEPHONE (OUTPATIENT)
Dept: ENDOCRINOLOGY | Facility: HOSPITAL | Age: 79
End: 2024-12-27

## 2024-12-27 DIAGNOSIS — E11.9 TYPE 2 DIABETES MELLITUS WITHOUT COMPLICATION, WITHOUT LONG-TERM CURRENT USE OF INSULIN (HCC): ICD-10-CM

## 2024-12-27 DIAGNOSIS — E11.8 TYPE 2 DIABETES MELLITUS WITH COMPLICATION, WITHOUT LONG-TERM CURRENT USE OF INSULIN (HCC): ICD-10-CM

## 2024-12-27 RX ORDER — SITAGLIPTIN 100 MG/1
100 TABLET, FILM COATED ORAL DAILY
Qty: 90 TABLET | Refills: 1 | Status: SHIPPED | OUTPATIENT
Start: 2024-12-27

## 2024-12-27 NOTE — TELEPHONE ENCOUNTER
Patient called the RX Refill Line. Message is being forwarded to the office.     Patient is requesting amlodipine 5mg, once daily. I dont see this dosage on his med list. He would like a script sent to [facility].     Please contact patient at  [phone] with any questions regarding the dosage.

## 2024-12-27 NOTE — TELEPHONE ENCOUNTER
Reason for call:   [x] Refill   [] Prior Auth  [] Other:     Office:   [] PCP/Provider -   [x] Specialty/Provider - endo/ VAIBHAV Valle    Medication: Januvia 100 MG tablet     Dose/Frequency: 100 mg, Oral, Daily     Quantity: 90    Pharmacy: [id number] - [place] [address]     Does the patient have enough for 3 days?   [] Yes   [x] No - Send as HP to POD

## 2025-02-20 ENCOUNTER — TELEPHONE (OUTPATIENT)
Age: 80
End: 2025-02-20

## 2025-02-20 NOTE — TELEPHONE ENCOUNTER
Patient's wife called in states that they dropped a letter off when patient was last in for the office visit about after Jan 1st Januvia may not be covered but she said patient would really like to stay on Januvia so she is asking how this can happen if possible, but she said if not the letter listed alternative, please advise.

## 2025-02-21 DIAGNOSIS — E11.8 TYPE 2 DIABETES MELLITUS WITH COMPLICATION, WITHOUT LONG-TERM CURRENT USE OF INSULIN (HCC): ICD-10-CM

## 2025-02-21 DIAGNOSIS — E78.5 HYPERLIPIDEMIA, UNSPECIFIED HYPERLIPIDEMIA TYPE: ICD-10-CM

## 2025-02-21 RX ORDER — SITAGLIPTIN 100 MG/1
TABLET ORAL
Qty: 90 TABLET | Refills: 1 | Status: SHIPPED | OUTPATIENT
Start: 2025-02-21

## 2025-02-21 RX ORDER — ATORVASTATIN CALCIUM 40 MG/1
40 TABLET, FILM COATED ORAL DAILY
Qty: 90 TABLET | Refills: 1 | Status: SHIPPED | OUTPATIENT
Start: 2025-02-21

## 2025-02-21 NOTE — TELEPHONE ENCOUNTER
Medication: atorvastatin 40 mg     Dose/Frequency: 40 mg once daily    Quantity: 90 day supply    [facility] [place]    Office:   [] PCP/Provider -   [x] Speciality/Provider - Endo    Does the patient have enough for 3 days?   [] Yes   [x] No - Send as HP to POD

## 2025-02-21 NOTE — TELEPHONE ENCOUNTER
Medication: Zituvio 100 mg     Dose/Frequency: once daily    Quantity: 90 day supply    Ph[facility] [place]    Office:   [] PCP/Provider -   [x] Speciality/Provider - Endo    Does the patient have enough for 3 days?   [] Yes   [x] No - Send as HP to POD

## 2025-02-21 NOTE — TELEPHONE ENCOUNTER
Patient calling back, relayed the above message and she asked for the Zituvio and atorvastatin to be sent to the [facility] in [place]. Refill started.  No further action needed

## 2025-02-24 ENCOUNTER — TELEPHONE (OUTPATIENT)
Age: 80
End: 2025-02-24

## 2025-02-24 NOTE — TELEPHONE ENCOUNTER
PA for (Zituvio) 100 MG SUBMITTED to Medicare     via      [x][employer]-Case ID # [id number]       [x]PA sent as URGENT    All office notes, labs and other pertaining documents and studies sent. Clinical questions answered. Awaiting determination from insurance company.     Turnaround time for your insurance to make a decision on your Prior Authorization can take 7-21 business days.

## 2025-02-25 NOTE — TELEPHONE ENCOUNTER
PA for (Zituvio) 100 MG  DENIED    Reason:(Screenshot if applicable)        Message sent to office clinical pool Yes    Denial letter scanned into Media Yes    Appeal started No (Provider will need to decide if appeal is warranted and send clinical documentation to Prior Authorization Team for initiation.)    **Please follow up with your patient regarding denial and next steps**

## 2025-02-25 NOTE — TELEPHONE ENCOUNTER
Please try prior auth again. It says it was denied because it was requested as  90 tablets in a 30 day period. He is taking 1 tablet daily. It should be 90 tablets per 90 days because he does 90 day supply through mail order.

## 2025-06-06 LAB
ALBUMIN SERPL-MCNC: 4.4 G/DL (ref 3.6–5.1)
ALBUMIN/CREAT UR: 192 MG/G CREAT
ALBUMIN/GLOB SERPL: 1.8 (CALC) (ref 1–2.5)
ALP SERPL-CCNC: 73 U/L (ref 35–144)
ALT SERPL-CCNC: 13 U/L (ref 9–46)
AST SERPL-CCNC: 17 U/L (ref 10–35)
BASOPHILS # BLD AUTO: 49 CELLS/UL (ref 0–200)
BASOPHILS NFR BLD AUTO: 0.7 %
BILIRUB SERPL-MCNC: 0.6 MG/DL (ref 0.2–1.2)
BUN SERPL-MCNC: 23 MG/DL (ref 7–25)
BUN/CREAT SERPL: 15 (CALC) (ref 6–22)
CALCIUM SERPL-MCNC: 9.5 MG/DL (ref 8.6–10.3)
CHLORIDE SERPL-SCNC: 99 MMOL/L (ref 98–110)
CHOLEST SERPL-MCNC: 186 MG/DL
CHOLEST/HDLC SERPL: 4.8 (CALC)
CO2 SERPL-SCNC: 26 MMOL/L (ref 20–32)
CREAT SERPL-MCNC: 1.51 MG/DL (ref 0.7–1.22)
CREAT UR-MCNC: 62 MG/DL (ref 20–320)
EOSINOPHIL # BLD AUTO: 490 CELLS/UL (ref 15–500)
EOSINOPHIL NFR BLD AUTO: 7 %
ERYTHROCYTE [DISTWIDTH] IN BLOOD BY AUTOMATED COUNT: 12.7 % (ref 11–15)
GFR/BSA.PRED SERPLBLD CYS-BASED-ARV: 46 ML/MIN/1.73M2
GLOBULIN SER CALC-MCNC: 2.4 G/DL (CALC) (ref 1.9–3.7)
GLUCOSE SERPL-MCNC: 164 MG/DL (ref 65–99)
HBA1C MFR BLD: 7.6 %
HCT VFR BLD AUTO: 35.4 % (ref 38.5–50)
HDLC SERPL-MCNC: 39 MG/DL
HGB BLD-MCNC: 11.8 G/DL (ref 13.2–17.1)
LDLC SERPL CALC-MCNC: 115 MG/DL (CALC)
LYMPHOCYTES # BLD AUTO: 966 CELLS/UL (ref 850–3900)
LYMPHOCYTES NFR BLD AUTO: 13.8 %
MCH RBC QN AUTO: 31.9 PG (ref 27–33)
MCHC RBC AUTO-ENTMCNC: 33.3 G/DL (ref 32–36)
MCV RBC AUTO: 95.7 FL (ref 80–100)
MICROALBUMIN UR-MCNC: 11.9 MG/DL
MONOCYTES # BLD AUTO: 777 CELLS/UL (ref 200–950)
MONOCYTES NFR BLD AUTO: 11.1 %
NEUTROPHILS # BLD AUTO: 4718 CELLS/UL (ref 1500–7800)
NEUTROPHILS NFR BLD AUTO: 67.4 %
NONHDLC SERPL-MCNC: 147 MG/DL (CALC)
PLATELET # BLD AUTO: 227 THOUSAND/UL (ref 140–400)
PMV BLD REES-ECKER: 10.4 FL (ref 7.5–12.5)
POTASSIUM SERPL-SCNC: 4.4 MMOL/L (ref 3.5–5.3)
PROT SERPL-MCNC: 6.8 G/DL (ref 6.1–8.1)
RBC # BLD AUTO: 3.7 MILLION/UL (ref 4.2–5.8)
SODIUM SERPL-SCNC: 135 MMOL/L (ref 135–146)
T4 FREE SERPL-MCNC: 1.1 NG/DL (ref 0.8–1.8)
TRIGL SERPL-MCNC: 199 MG/DL
TSH SERPL-ACNC: 5.68 MIU/L (ref 0.4–4.5)
WBC # BLD AUTO: 7 THOUSAND/UL (ref 3.8–10.8)

## 2025-06-10 ENCOUNTER — RESULTS FOLLOW-UP (OUTPATIENT)
Dept: ENDOCRINOLOGY | Facility: HOSPITAL | Age: 80
End: 2025-06-10

## 2025-06-12 ENCOUNTER — OFFICE VISIT (OUTPATIENT)
Dept: ENDOCRINOLOGY | Facility: HOSPITAL | Age: 80
End: 2025-06-12
Payer: MEDICARE

## 2025-06-12 VITALS
SYSTOLIC BLOOD PRESSURE: 114 MMHG | DIASTOLIC BLOOD PRESSURE: 80 MMHG | BODY MASS INDEX: 27.89 KG/M2 | HEART RATE: 54 BPM | WEIGHT: 194.8 LBS | HEIGHT: 70 IN

## 2025-06-12 DIAGNOSIS — E78.5 HYPERLIPIDEMIA, UNSPECIFIED HYPERLIPIDEMIA TYPE: ICD-10-CM

## 2025-06-12 DIAGNOSIS — I10 ESSENTIAL HYPERTENSION: ICD-10-CM

## 2025-06-12 DIAGNOSIS — E11.8 TYPE 2 DIABETES MELLITUS WITH COMPLICATION, WITHOUT LONG-TERM CURRENT USE OF INSULIN (HCC): ICD-10-CM

## 2025-06-12 DIAGNOSIS — E11.9 TYPE 2 DIABETES MELLITUS WITHOUT COMPLICATION, WITHOUT LONG-TERM CURRENT USE OF INSULIN (HCC): Primary | ICD-10-CM

## 2025-06-12 PROCEDURE — 99214 OFFICE O/P EST MOD 30 MIN: CPT | Performed by: NURSE PRACTITIONER

## 2025-06-12 RX ORDER — HYDROCHLOROTHIAZIDE 12.5 MG/1
12.5 TABLET ORAL DAILY
COMMUNITY
Start: 2025-04-29

## 2025-06-12 NOTE — PROGRESS NOTES
Name: Javi Cardona      : 1945      MRN: [id number]  Encounter Provider: VAIBHAV Valle  Encounter Date: 2025   Encounter department: [facility]    No chief complaint on file.  :  Assessment & Plan  Type 2 diabetes mellitus without complication, without long-term current use of insulin (HCC)    Lab Results   Component Value Date    HGBA1C 7.6 (H) 2025       Orders:    CBC and differential; Future    Comprehensive metabolic panel; Future    Hemoglobin A1C; Future    Lipid panel; Future    TSH, 3rd generation with Free T4 reflex; Future    Type 2 diabetes mellitus with complication, without long-term current use of insulin (HCC)    Lab Results   Component Value Date    HGBA1C 7.6 (H) 2025       Orders:    CBC and differential; Future    Comprehensive metabolic panel; Future    Hemoglobin A1C; Future    Lipid panel; Future    TSH, 3rd generation with Free T4 reflex; Future    Essential hypertension    Orders:    CBC and differential; Future    Comprehensive metabolic panel; Future    Hemoglobin A1C; Future    Lipid panel; Future    TSH, 3rd generation with Free T4 reflex; Future    Hyperlipidemia, unspecified hyperlipidemia type    Orders:    CBC and differential; Future    Comprehensive metabolic panel; Future    Hemoglobin A1C; Future    Lipid panel; Future    TSH, 3rd generation with Free T4 reflex; Future     Plan:  1.  Type 2 diabetes:  His hemoglobin A1c is elevated to 7.6.  Has not been following a proper diabetic diet.  After some discussion, he will continue metformin 1000 mg twice daily, glimepiride 2 mg daily and Januvia 100 mg daily.  He will continue to check his blood sugars twice daily at alternating times and send a record to the office in 2 weeks for review.  Check hemoglobin A1c prior to next visit.     2.  Hyperlipidemia: LDL is more elevated.  Continue Lipitor. Managed by Cardiology.  Check fasting lipid panel prior  to next office visit.     3.  Hypertension: He is normotensive in the office today.  Continue current regimen. Check comprehensive metabolic panel prior to next visit.     4.  Vitamin-D deficiency:  Continue supplement with vitamin D3 daily.          History of Present Illness     80 y.o. year old male with type 2 diabetes for over 10 years.  He is on oral agents at home and takes Metformin 1000 twice daily, Glimepiride 2 mg daily at dinner and Januvia (Zituvo) 100 daily. He denies any polyuria, polydipsia, nocturia and blurry vision.  He denies neuropathy, nephropathy and retinopathy.   His most recent hemoglobin A1c from June 6, 2025 is 7.6.       Hypoglycemic episodes: No.      The patient's last eye exam was on July 17, 2023.  He has no complaints about his feet and does follow Podiatry for regular diabetic foot care with [facility].  Most recent diabetic foot exam was performed by Dr. Chapa in May 2024, by his report.     Blood Sugar/Glucometer/Pump/CGM review:  No blood sugar readings available for review.     For his hypertension, he is treated with ramipril 20 mg daily and metoprolol 50 mg daily.     His hyperlipidemia is treated with atorvastatin 40 mg daily and Omega 3 Krill oil  Daily.     For his vitamin-D deficiency, he supplements with vitamin D3 daily.         Review of Systems   Constitutional: Negative.  Negative for chills, fatigue and fever.   HENT: Negative.  Negative for trouble swallowing and voice change.    Eyes:  Negative for photophobia, pain, discharge, redness, itching and visual disturbance.   Respiratory:  Negative for cough and shortness of breath.    Cardiovascular:  Negative for chest pain and palpitations.   Gastrointestinal:  Negative for abdominal pain, constipation, diarrhea, nausea and vomiting.   Endocrine: Negative for cold intolerance, heat intolerance, polydipsia, polyphagia and polyuria.   Genitourinary: Negative.    Musculoskeletal:  Positive for  "arthralgias and back pain (chronic).   Skin: Negative.    Allergic/Immunologic: Negative.    Neurological:  Negative for dizziness, syncope, light-headedness and headaches.   Hematological: Negative.    Psychiatric/Behavioral: Negative.     All other systems reviewed and are negative.   as per HPI      Objective   /80   Pulse (!) 54   Ht 5' 10\" (1.778 m)   Wt 88.4 kg (194 lb 12.8 oz)   BMI 27.95 kg/m²      Body mass index is 27.95 kg/m².  Wt Readings from Last 3 Encounters:   06/12/25 88.4 kg (194 lb 12.8 oz)   12/12/24 86.1 kg (189 lb 12.8 oz)   06/12/24 83.7 kg (184 lb 9.6 oz)     Physical Exam  Vitals reviewed.   Constitutional:       Appearance: He is well-developed.   HENT:      Head: Normocephalic and atraumatic.      Nose: Nose normal.     Eyes:      Conjunctiva/sclera: Conjunctivae normal.      Pupils: Pupils are equal, round, and reactive to light.       Cardiovascular:      Rate and Rhythm: Normal rate and regular rhythm.      Pulses: no weak pulses.           Dorsalis pedis pulses are 1+ on the right side and 1+ on the left side.        Posterior tibial pulses are 1+ on the right side and 1+ on the left side.      Heart sounds: Normal heart sounds.   Pulmonary:      Effort: Pulmonary effort is normal.      Breath sounds: Normal breath sounds.   Abdominal:      General: Bowel sounds are normal.      Palpations: Abdomen is soft.     Musculoskeletal:         General: Normal range of motion.      Cervical back: Normal range of motion and neck supple.   Feet:      Right foot:      Skin integrity: No ulcer, skin breakdown, erythema, warmth, callus or dry skin.      Left foot:      Skin integrity: No ulcer, skin breakdown, erythema, warmth, callus or dry skin.     Skin:     General: Skin is warm and dry.     Neurological:      Mental Status: He is alert and oriented to person, place, and time.     Psychiatric:         Behavior: Behavior normal.         Thought Content: Thought content normal.         " Judgment: Judgment normal.     Patient's shoes and socks removed.    Right Foot/Ankle   Right Foot Inspection  Skin Exam: skin normal and skin intact. No dry skin, no warmth, no callus, no erythema, no maceration, no abnormal color, no pre-ulcer, no ulcer and no callus.     Toe Exam: ROM and strength within normal limits.     Sensory   Monofilament testing: intact    Vascular  Capillary refills: < 3 seconds  The right DP pulse is 1+. The right PT pulse is 1+.     Left Foot/Ankle  Left Foot Inspection  Skin Exam: skin normal and skin intact. No dry skin, no warmth, no erythema, no maceration, normal color, no pre-ulcer, no ulcer and no callus.     Toe Exam: ROM and strength within normal limits.     Sensory   Monofilament testing: intact    Vascular  Capillary refills: < 3 seconds  The left DP pulse is 1+. The left PT pulse is 1+.     Assign Risk Category  No deformity present  No loss of protective sensation  No weak pulses  Risk: 0    Last Eye Exam: 07/17/2023  Last Foot Exam: 12/08/2022  Health Maintenance   Topic Date Due    Diabetic Foot Exam  12/08/2023    Diabetic Eye Exam  07/17/2025         Labs: I have reviewed pertinent labs including:   Lab Results   Component Value Date    HGBA1C 7.6 (H) 06/06/2025    HGBA1C 7.2 (H) 12/06/2024    HGBA1C 7.9 (H) 06/07/2024      Lab Results   Component Value Date    CREATININE 1.51 (H) 06/06/2025    CREATININE 1.34 (H) 12/06/2024    CREATININE 1.38 (H) 06/07/2024    BUN 23 06/06/2025    K 4.4 06/06/2025    CL 99 06/06/2025    CO2 26 06/06/2025      eGFR   Date Value Ref Range Status   06/06/2025 46 (L) > OR = 60 mL/min/1.73m2 Final      HDL   Date Value Ref Range Status   06/06/2025 39 (L) > OR = 40 mg/dL Final     Triglycerides   Date Value Ref Range Status   06/06/2025 199 (H) <150 mg/dL Final      ALT   Date Value Ref Range Status   06/06/2025 13 9 - 46 U/L Final     AST   Date Value Ref Range Status   06/06/2025 17 10 - 35 U/L Final     Alkaline Phosphatase   Date  "Value Ref Range Status   06/06/2025 73 35 - 144 U/L Final      No results found for: \"[id number]\"   Lab Results   Component Value Date    FREET4 1.1 06/06/2025      Discussed with the patient and all questioned fully answered. He will call me if any problems arise.      "

## 2025-06-12 NOTE — ASSESSMENT & PLAN NOTE
Lab Results   Component Value Date    HGBA1C 7.6 (H) 06/06/2025       Orders:    CBC and differential; Future    Comprehensive metabolic panel; Future    Hemoglobin A1C; Future    Lipid panel; Future    TSH, 3rd generation with Free T4 reflex; Future

## 2025-06-12 NOTE — PATIENT INSTRUCTIONS
Be mindful of diet.     Stay active and stay hydrated with water.     Check your blood sugars regularly twice daily at alternating times as discussed.     Continue your current diabetic medication regimen with Metformin, Glimiperide and Januvia (Zituvo).     Continue metoprolol and ramipril 20 mg daily.     Continue atorvastatin and Omega 3 Krill oil.     Continue supplement with vitamin D3 daily.     Obtain a diabetic eye exam.

## 2025-06-12 NOTE — ASSESSMENT & PLAN NOTE
Orders:    CBC and differential; Future    Comprehensive metabolic panel; Future    Hemoglobin A1C; Future    Lipid panel; Future    TSH, 3rd generation with Free T4 reflex; Future

## 2025-06-12 NOTE — ASSESSMENT & PLAN NOTE
Orders:    CBC and differential; Future    Comprehensive metabolic panel; Future    Hemoglobin A1C; Future    Lipid panel; Future    TSH, 3rd generation with Free T4 reflex; Future     Plan:  1.  Type 2 diabetes:  His hemoglobin A1c is elevated to 7.6.  Has not been following a proper diabetic diet.  After some discussion, he will continue metformin 1000 mg twice daily, glimepiride 2 mg daily and Januvia 100 mg daily.  He will continue to check his blood sugars twice daily at alternating times and send a record to the office in 2 weeks for review.  Check hemoglobin A1c prior to next visit.     2.  Hyperlipidemia: LDL is more elevated.  Continue Lipitor. Managed by Cardiology.  Check fasting lipid panel prior to next office visit.     3.  Hypertension: He is normotensive in the office today.  Continue current regimen. Check comprehensive metabolic panel prior to next visit.     4.  Vitamin-D deficiency:  Continue supplement with vitamin D3 daily.

## 2025-06-16 ENCOUNTER — TELEPHONE (OUTPATIENT)
Age: 80
End: 2025-06-16

## 2025-06-16 NOTE — TELEPHONE ENCOUNTER
"Patients spouse calling in regard to last completed labs    States \"can you send the results summary from the last set of labs\"    Patients spouse requesting mailed to address on file  "

## 2025-08-08 DIAGNOSIS — E11.9 TYPE 2 DIABETES MELLITUS WITHOUT COMPLICATION, WITHOUT LONG-TERM CURRENT USE OF INSULIN (HCC): ICD-10-CM

## 2025-08-08 RX ORDER — METFORMIN HYDROCHLORIDE 500 MG/1
500 TABLET, EXTENDED RELEASE ORAL 2 TIMES DAILY
Qty: 180 TABLET | Refills: 1 | Status: SHIPPED | OUTPATIENT
Start: 2025-08-08